# Patient Record
Sex: FEMALE | Race: WHITE | HISPANIC OR LATINO | Employment: FULL TIME | URBAN - METROPOLITAN AREA
[De-identification: names, ages, dates, MRNs, and addresses within clinical notes are randomized per-mention and may not be internally consistent; named-entity substitution may affect disease eponyms.]

---

## 2017-01-02 ENCOUNTER — PRE VISIT (OUTPATIENT)
Dept: NEUROLOGY | Facility: CLINIC | Age: 47
End: 2017-01-02

## 2017-01-02 NOTE — TELEPHONE ENCOUNTER
1.  Date/reason for appt: 1/26/17 MS   2.  Referring provider: ERNESTINE GILMAN   3.  Call to patient (Yes / No - short description): no, referral   4.  Previous care at / records requested from: [facility]   5.  Other: 10/27/16 office note received from [facility], will forward to clinic. Will need to request additional records.

## 2017-01-10 ENCOUNTER — APPOINTMENT (OUTPATIENT)
Dept: OCCUPATIONAL MEDICINE | Facility: OTHER | Age: 47
End: 2017-01-10

## 2017-01-17 ENCOUNTER — APPOINTMENT (OUTPATIENT)
Dept: OCCUPATIONAL MEDICINE | Facility: OTHER | Age: 47
End: 2017-01-17

## 2017-01-19 ENCOUNTER — HISTORY (OUTPATIENT)
Dept: FAMILY MEDICINE | Facility: OTHER | Age: 47
End: 2017-01-19

## 2017-01-19 ENCOUNTER — OFFICE VISIT - GICH (OUTPATIENT)
Dept: FAMILY MEDICINE | Facility: OTHER | Age: 47
End: 2017-01-19

## 2017-01-19 DIAGNOSIS — B00.1 HERPESVIRAL VESICULAR DERMATITIS: ICD-10-CM

## 2017-01-24 ENCOUNTER — HOSPITAL ENCOUNTER (EMERGENCY)
Facility: HOSPITAL | Age: 47
Discharge: HOME | End: 2017-01-24
Attending: PHYSICIAN ASSISTANT | Admitting: PHYSICIAN ASSISTANT
Payer: MEDICAID

## 2017-01-24 VITALS
TEMPERATURE: 97.8 F | SYSTOLIC BLOOD PRESSURE: 136 MMHG | RESPIRATION RATE: 20 BRPM | DIASTOLIC BLOOD PRESSURE: 47 MMHG | OXYGEN SATURATION: 94 %

## 2017-01-24 DIAGNOSIS — L03.115 CELLULITIS OF RIGHT LEG: ICD-10-CM

## 2017-01-24 PROCEDURE — 99212 OFFICE O/P EST SF 10 MIN: CPT

## 2017-01-24 PROCEDURE — 99203 OFFICE O/P NEW LOW 30 MIN: CPT | Performed by: PHYSICIAN ASSISTANT

## 2017-01-24 RX ORDER — MUPIROCIN 20 MG/G
OINTMENT TOPICAL 3 TIMES DAILY
Qty: 15 G | Refills: 1 | Status: SHIPPED | OUTPATIENT
Start: 2017-01-24 | End: 2017-04-07

## 2017-01-24 RX ORDER — CEPHALEXIN 500 MG/1
500 CAPSULE ORAL 3 TIMES DAILY
Qty: 30 CAPSULE | Refills: 0 | Status: SHIPPED | OUTPATIENT
Start: 2017-01-24 | End: 2017-02-03

## 2017-01-24 RX ORDER — GLATIRAMER ACETATE 20 MG/ML
20 INJECTION, SOLUTION SUBCUTANEOUS DAILY
COMMUNITY

## 2017-01-24 ASSESSMENT — ENCOUNTER SYMPTOMS
PSYCHIATRIC NEGATIVE: 1
NEUROLOGICAL NEGATIVE: 1
WOUND: 1
CONSTITUTIONAL NEGATIVE: 1
CARDIOVASCULAR NEGATIVE: 1

## 2017-01-24 NOTE — ED AVS SNAPSHOT
[facility]    [address]    [place] [address]    Phone:  [phone]                                       Carin Shea   MRN: [id number]    Department:  [facility]   Date of Visit:  1/24/2017           Patient Information     Date Of Birth          7/17/1969        Your diagnoses for this visit were:     Cellulitis of right leg Pt states her tetanus is UTD       You were seen by Citlaly Her PA.      Follow-up Information     Follow up with Santana Cota MD.    Specialties:  Internal Medicine, Internal Medicine    Why:  If symptoms worsen    Contact information:    [facility]  [address]  [place] [address]  [phone]        Discharge References/Attachments     CELLULITIS, DISCHARGE INSTRUCTIONS FOR (ENGLISH)      Future Appointments        Provider Department Dept Phone Center    1/25/2017 2:30 PM La Burrell RN  [facility] [phone] [facility]    1/26/2017 1:30 PM Phillip Howell MD [facility] Multiple Sclerosis [phone] [facility]         Review of your medicines      START taking        Dose / Directions Last dose taken    cephALEXin 500 MG capsule   Commonly known as:  KEFLEX   Dose:  500 mg   Quantity:  30 capsule        Take 1 capsule (500 mg) by mouth 3 times daily for 10 days   Refills:  0        mupirocin 2 % ointment   Commonly known as:  BACTROBAN   Quantity:  15 g        Apply topically 3 times daily   Refills:  1          Our records show that you are taking the medicines listed below. If these are incorrect, please call your family doctor or clinic.        Dose / Directions Last dose taken    CITALOPRAM HYDROBROMIDE PO   Dose:  40 mg        Take 40 mg by mouth   Refills:  0        CLONAZEPAM PO   Dose:  1 mg        Take 1 mg by mouth   Refills:  0        COPAXONE 20 MG/ML injection   Dose:  20 mg   Generic drug:  glatiramer        Inject 20 mg Subcutaneous daily   Refills:  0        LISINOPRIL PO  "  Dose:  10 mg        Take 10 mg by mouth   Refills:  0        METFORMIN HCL PO   Dose:  1000 mg        Take 1,000 mg by mouth 2 times daily (with meals)   Refills:  0        Multi-vitamin Tabs tablet   Generic drug:  multivitamin, therapeutic with minerals        1 TABLET DAILY   Refills:  0                Prescriptions were sent or printed at these locations (2 Prescriptions)                   [facility] [address] [place] [address]    Telephone:  [phone]   Fax:  [phone]   Hours:                  E-Prescribed (2 of 2)         cephALEXin (KEFLEX) 500 MG capsule               mupirocin (BACTROBAN) 2 % ointment                Orders Needing Specimen Collection     None      Pending Results     No orders found from 2017 to 2017.            Pending Culture Results     No orders found from 2017 to 2017.            Thank you for choosing [facility]       Thank you for choosing [facility] for your care. Our goal is always to provide you with excellent care. Hearing back from our patients is one way we can continue to improve our services. Please take a few minutes to complete the written survey that you may receive in the mail after you visit with us. Thank you!        [employer] Information     [employer] lets you send messages to your doctor, view your test results, renew your prescriptions, schedule appointments and more. To sign up, go to [email or web address] . Click on \"Log in\" on the left side of the screen, which will take you to the Welcome page. Then click on \"Sign up Now\" on the right side of the page.     You will be asked to enter the access code listed below, as well as some personal information. Please follow the directions to create your username and password.     Your access code is: [email or web address]  Expires: 2017  6:30 AM     Your access code will  in 90 days. If you need help or a new code, please " call your [facility] clinic or [phone].        Care EveryWhere ID     This is your Care EveryWhere ID. This could be used by other organizations to access your [facility] medical records  [id number]        After Visit Summary       This is your record. Keep this with you and show to your community pharmacist(s) and doctor(s) at your next visit.

## 2017-01-24 NOTE — ED AVS SNAPSHOT
[facility]    [address] [place] [address]    Phone:  [phone]                                       Carin Shea   MRN: [id number]    Department:  [facility]   Date of Visit:  1/24/2017           After Visit Summary Signature Page     I have received my discharge instructions, and my questions have been answered. I have discussed any challenges I see with this plan with the nurse or doctor.    ..........................................................................................................................................  Patient/Patient Representative Signature      ..........................................................................................................................................  Patient Representative Print Name and Relationship to Patient    ..................................................               ................................................  Date                                            Time    ..........................................................................................................................................  Reviewed by Signature/Title    ...................................................              ..............................................  Date                                                            Time

## 2017-01-24 NOTE — ED NOTES
Pt presents today with her mother for c/o a laceration to her right lower leg that is red, swollen and warm to the touch.
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
## 2017-01-26 ENCOUNTER — APPOINTMENT (OUTPATIENT)
Dept: OCCUPATIONAL MEDICINE | Facility: OTHER | Age: 47
End: 2017-01-26

## 2017-01-27 ENCOUNTER — DOCUMENTATION ONLY (OUTPATIENT)
Dept: NEUROLOGY | Facility: CLINIC | Age: 47
End: 2017-01-27

## 2017-02-09 ENCOUNTER — HOSPITAL ENCOUNTER (EMERGENCY)
Facility: HOSPITAL | Age: 47
Discharge: HOME | End: 2017-02-09
Attending: INTERNAL MEDICINE | Admitting: INTERNAL MEDICINE
Payer: MEDICAID

## 2017-02-09 VITALS
HEART RATE: 84 BPM | OXYGEN SATURATION: 94 % | RESPIRATION RATE: 20 BRPM | TEMPERATURE: 97.9 F | SYSTOLIC BLOOD PRESSURE: 118 MMHG | DIASTOLIC BLOOD PRESSURE: 67 MMHG

## 2017-02-09 DIAGNOSIS — L08.9 LOCAL INFECTION OF WOUND: ICD-10-CM

## 2017-02-09 DIAGNOSIS — T14.8XXA LOCAL INFECTION OF WOUND: ICD-10-CM

## 2017-02-09 LAB
ALBUMIN SERPL-MCNC: 3.6 G/DL (ref 3.4–5)
ALP SERPL-CCNC: 68 U/L (ref 40–150)
ALT SERPL W P-5'-P-CCNC: 87 U/L (ref 0–50)
ANION GAP SERPL CALCULATED.3IONS-SCNC: 5 MMOL/L (ref 3–14)
AST SERPL W P-5'-P-CCNC: 58 U/L (ref 0–45)
BASOPHILS # BLD AUTO: 0.1 10E9/L (ref 0–0.2)
BASOPHILS NFR BLD AUTO: 0.9 %
BILIRUB SERPL-MCNC: 0.4 MG/DL (ref 0.2–1.3)
BUN SERPL-MCNC: 10 MG/DL (ref 7–30)
CALCIUM SERPL-MCNC: 8.4 MG/DL (ref 8.5–10.1)
CHLORIDE SERPL-SCNC: 106 MMOL/L (ref 94–109)
CK SERPL-CCNC: 122 U/L (ref 30–225)
CO2 SERPL-SCNC: 24 MMOL/L (ref 20–32)
CREAT SERPL-MCNC: 0.65 MG/DL (ref 0.52–1.04)
CRP SERPL-MCNC: 19.7 MG/L (ref 0–8)
DIFFERENTIAL METHOD BLD: NORMAL
EOSINOPHIL # BLD AUTO: 0.3 10E9/L (ref 0–0.7)
EOSINOPHIL NFR BLD AUTO: 3 %
ERYTHROCYTE [DISTWIDTH] IN BLOOD BY AUTOMATED COUNT: 12.9 % (ref 10–15)
ERYTHROCYTE [SEDIMENTATION RATE] IN BLOOD BY WESTERGREN METHOD: 19 MM/H (ref 0–20)
GFR SERPL CREATININE-BSD FRML MDRD: ABNORMAL ML/MIN/1.7M2
GLUCOSE SERPL-MCNC: 173 MG/DL (ref 70–99)
GRAM STN SPEC: NORMAL
HCT VFR BLD AUTO: 36.7 % (ref 35–47)
HGB BLD-MCNC: 12.7 G/DL (ref 11.7–15.7)
IMM GRANULOCYTES # BLD: 0 10E9/L (ref 0–0.4)
IMM GRANULOCYTES NFR BLD: 0.3 %
LYMPHOCYTES # BLD AUTO: 3 10E9/L (ref 0.8–5.3)
LYMPHOCYTES NFR BLD AUTO: 33.5 %
MCH RBC QN AUTO: 30.7 PG (ref 26.5–33)
MCHC RBC AUTO-ENTMCNC: 34.6 G/DL (ref 31.5–36.5)
MCV RBC AUTO: 89 FL (ref 78–100)
MICRO REPORT STATUS: NORMAL
MONOCYTES # BLD AUTO: 0.7 10E9/L (ref 0–1.3)
MONOCYTES NFR BLD AUTO: 7.7 %
NEUTROPHILS # BLD AUTO: 4.9 10E9/L (ref 1.6–8.3)
NEUTROPHILS NFR BLD AUTO: 54.6 %
NRBC # BLD AUTO: 0 10*3/UL
NRBC BLD AUTO-RTO: 0 /100
PLATELET # BLD AUTO: 275 10E9/L (ref 150–450)
POTASSIUM SERPL-SCNC: 3.6 MMOL/L (ref 3.4–5.3)
PROT SERPL-MCNC: 7.5 G/DL (ref 6.8–8.8)
RBC # BLD AUTO: 4.14 10E12/L (ref 3.8–5.2)
SODIUM SERPL-SCNC: 135 MMOL/L (ref 133–144)
SPECIMEN SOURCE: NORMAL
WBC # BLD AUTO: 9 10E9/L (ref 4–11)

## 2017-02-09 PROCEDURE — 85025 COMPLETE CBC W/AUTO DIFF WBC: CPT | Performed by: INTERNAL MEDICINE

## 2017-02-09 PROCEDURE — 87070 CULTURE OTHR SPECIMN AEROBIC: CPT | Performed by: INTERNAL MEDICINE

## 2017-02-09 PROCEDURE — 36415 COLL VENOUS BLD VENIPUNCTURE: CPT | Performed by: INTERNAL MEDICINE

## 2017-02-09 PROCEDURE — 85652 RBC SED RATE AUTOMATED: CPT | Performed by: INTERNAL MEDICINE

## 2017-02-09 PROCEDURE — 86140 C-REACTIVE PROTEIN: CPT | Performed by: INTERNAL MEDICINE

## 2017-02-09 PROCEDURE — 99284 EMERGENCY DEPT VISIT MOD MDM: CPT | Performed by: INTERNAL MEDICINE

## 2017-02-09 PROCEDURE — 25000132 ZZH RX MED GY IP 250 OP 250 PS 637: Performed by: INTERNAL MEDICINE

## 2017-02-09 PROCEDURE — 99284 EMERGENCY DEPT VISIT MOD MDM: CPT | Mod: 25

## 2017-02-09 PROCEDURE — 25000128 H RX IP 250 OP 636: Performed by: INTERNAL MEDICINE

## 2017-02-09 PROCEDURE — 82550 ASSAY OF CK (CPK): CPT | Performed by: INTERNAL MEDICINE

## 2017-02-09 PROCEDURE — 96372 THER/PROPH/DIAG INJ SC/IM: CPT

## 2017-02-09 PROCEDURE — 80053 COMPREHEN METABOLIC PANEL: CPT | Performed by: INTERNAL MEDICINE

## 2017-02-09 PROCEDURE — 87205 SMEAR GRAM STAIN: CPT | Performed by: INTERNAL MEDICINE

## 2017-02-09 RX ORDER — KETOROLAC TROMETHAMINE 30 MG/ML
60 INJECTION, SOLUTION INTRAMUSCULAR; INTRAVENOUS ONCE
Status: COMPLETED | OUTPATIENT
Start: 2017-02-09 | End: 2017-02-09

## 2017-02-09 RX ORDER — SULFAMETHOXAZOLE/TRIMETHOPRIM 800-160 MG
1 TABLET ORAL ONCE
Status: COMPLETED | OUTPATIENT
Start: 2017-02-09 | End: 2017-02-09

## 2017-02-09 RX ORDER — ONDANSETRON 2 MG/ML
4 INJECTION INTRAMUSCULAR; INTRAVENOUS ONCE
Status: COMPLETED | OUTPATIENT
Start: 2017-02-09 | End: 2017-02-09

## 2017-02-09 RX ORDER — SULFAMETHOXAZOLE/TRIMETHOPRIM 800-160 MG
1 TABLET ORAL 2 TIMES DAILY
Qty: 10 TABLET | Refills: 0 | Status: SHIPPED | OUTPATIENT
Start: 2017-02-09 | End: 2017-02-14

## 2017-02-09 RX ADMIN — SULFAMETHOXAZOLE AND TRIMETHOPRIM 1 TABLET: 800; 160 TABLET ORAL at 06:43

## 2017-02-09 RX ADMIN — ONDANSETRON 4 MG: 2 INJECTION INTRAMUSCULAR; INTRAVENOUS at 05:44

## 2017-02-09 RX ADMIN — KETOROLAC TROMETHAMINE 60 MG: 30 INJECTION, SOLUTION INTRAMUSCULAR; INTRAVENOUS at 05:44

## 2017-02-09 ASSESSMENT — ENCOUNTER SYMPTOMS
WHEEZING: 0
NECK STIFFNESS: 0
CONFUSION: 0
VOMITING: 0
SHORTNESS OF BREATH: 0
ANAL BLEEDING: 0
CHEST TIGHTNESS: 0
HEADACHES: 0
BLOOD IN STOOL: 0
VOICE CHANGE: 0
DYSURIA: 0
ABDOMINAL PAIN: 0
LIGHT-HEADEDNESS: 0
BACK PAIN: 0
CHILLS: 0
ABDOMINAL DISTENTION: 0
PALPITATIONS: 0
HEMATURIA: 0
DIZZINESS: 0
NAUSEA: 0
DIAPHORESIS: 0
FLANK PAIN: 0
COUGH: 0
NECK PAIN: 0
WOUND: 0
NUMBNESS: 0
COLOR CHANGE: 0
MYALGIAS: 0
FEVER: 0
ARTHRALGIAS: 0

## 2017-02-09 NOTE — ED NOTES
"Pt to room 1 ambulatory. Pt states that 2 weeks ago she \"fell through\" a heating duct and cut her left lateral calf. Pt was seen in UC and was started on keflex and bactroban, pt has finished the keflex. Pt notes increasing swelling, pain and redness. Laceration is scabbed over with redness around and one are with yellow/white drainage. Pt notes that it is difficult to walk. CMS intact. Call light in reach.  "

## 2017-02-09 NOTE — ED AVS SNAPSHOT
[facility]    [address] [place] [address]    Phone:  [phone]                                       Carin Shea   MRN: [id number]    Department[facility]  [facility]   Date of Visit:  2/9/2017           After Visit Summary Signature Page     I have received my discharge instructions, and my questions have been answered. I have discussed any challenges I see with this plan with the nurse or doctor.    ..........................................................................................................................................  Patient/Patient Representative Signature      ..........................................................................................................................................  Patient Representative Print Name and Relationship to Patient    ..................................................               ................................................  Date                                            Time    ..........................................................................................................................................  Reviewed by Signature/Title    ...................................................              ..............................................  Date                                                            Time

## 2017-02-09 NOTE — ED PROVIDER NOTES
History     Chief Complaint   Patient presents with     Cellulitis     right lateral leg, cut leg 2 weeks ago on heating duct now red, swollen     The history is provided by the patient.     Carin Shea is a 47 year old female who came for R lower leg pain/ redness. She had a laceration 2 wks ago due to fall and cut of lateral part of her R lower leg with metal edge. She was treated with cephalexin for possible celllultis.     I have reviewed the Medications, Allergies, Past Medical and Surgical History, and Social History in the Epic system.    Review of Systems   Constitutional: Negative for fever, chills and diaphoresis.   HENT: Negative for voice change.    Eyes: Negative for visual disturbance.   Respiratory: Negative for cough, chest tightness, shortness of breath and wheezing.    Cardiovascular: Negative for chest pain, palpitations and leg swelling.   Gastrointestinal: Negative for nausea, vomiting, abdominal pain, blood in stool, abdominal distention and anal bleeding.   Genitourinary: Negative for dysuria, hematuria, flank pain and decreased urine volume.   Musculoskeletal: Negative for myalgias, back pain, arthralgias, gait problem, neck pain and neck stiffness.   Skin: Negative for color change, pallor, rash and wound.   Neurological: Negative for dizziness, syncope, light-headedness, numbness and headaches.   Psychiatric/Behavioral: Negative for suicidal ideas and confusion.       Physical Exam   BP: 137/87 mmHg  Pulse: 84  Heart Rate: 95  Temp: 98.2  F (36.8  C)  Resp: 20  SpO2: 98 %  Physical Exam   Constitutional: She is oriented to person, place, and time. She appears well-developed and well-nourished.   HENT:   Head: Normocephalic and atraumatic.   Mouth/Throat: No oropharyngeal exudate.   Eyes: Conjunctivae are normal. Pupils are equal, round, and reactive to light.   Neck: Normal range of motion. Neck supple. No JVD present. No tracheal deviation present. No thyromegaly present.    Cardiovascular: Normal rate, regular rhythm, normal heart sounds and intact distal pulses.  Exam reveals no gallop and no friction rub.    No murmur heard.  Pulmonary/Chest: Effort normal and breath sounds normal. No stridor. No respiratory distress. She has no wheezes. She has no rales. She exhibits no tenderness.   Abdominal: Soft. Bowel sounds are normal. She exhibits no distension and no mass. There is no tenderness. There is no rebound and no guarding.   Musculoskeletal: Normal range of motion. She exhibits no edema or tenderness.   Lymphadenopathy:     She has no cervical adenopathy.   Neurological: She is alert and oriented to person, place, and time.   Skin: Skin is warm and dry. No rash noted. No erythema. No pallor.        12 cm old laceration on lateral R lower leg, slight redness around the edges,   Slight serous discharge with squeezing the skin, no pus     Psychiatric: Her behavior is normal.   Nursing note and vitals reviewed.      ED Course   Procedures               Labs Ordered and Resulted from Time of ED Arrival Up to the Time of Departure from the ED   COMPREHENSIVE METABOLIC PANEL - Abnormal; Notable for the following:     Glucose 173 (*)     Calcium 8.4 (*)     ALT 87 (*)     AST 58 (*)     All other components within normal limits   CRP INFLAMMATION - Abnormal; Notable for the following:     CRP Inflammation 19.7 (*)     All other components within normal limits   CBC WITH PLATELETS DIFFERENTIAL   CK TOTAL   ERYTHROCYTE SEDIMENTATION RATE AUTO   WOUND CULTURE AEROBIC BACTERIAL   GRAM STAIN       Assessments & Plan (with Medical Decision Making)   Labs ordered  Overall clinical and lab finding dose not indicate a deep tissue infection  Wound culture was done  Pt started on bactrim, pt was advised to return to ER if had fever, chill, persistent pain, or if redness around wound is spreading  She voiced understanding and agreeed  I have reviewed the nursing notes.    I have reviewed the  findings, diagnosis, plan and need for follow up with the patient.    New Prescriptions    SULFAMETHOXAZOLE-TRIMETHOPRIM (BACTRIM DS) 800-160 MG PER TABLET    Take 1 tablet by mouth 2 times daily for 5 days       Final diagnoses:   Local infection of wound       2/9/2017   [facility]      Alban Hurt MD  02/09/17 0708

## 2017-02-09 NOTE — DISCHARGE INSTRUCTIONS
Wound Care  Taking proper care of your wound will help it heal. Your healthcare provider may show you how to clean and dress the wound. He or she will also explain how to tell if the wound is healing normally. If you are unsure of how to take care of the wound, be sure to clarify what dressing to use and how often you should change the bandages. Here are the basic steps.     A wound that's not healing normally may be dark in color or have white streaks.   Wash your hands  Tips for washing your hands include:    Use liquid soap and lather for 2 minutes. Scrub between your fingers and under your nails.    Rinse with warm water, keeping your fingers pointing down.    Use a paper towel to dry your hands and to turn off the faucet.  Remove the used dressing  Here are suggestions for removing the dressing:    If dressing changes cause you pain, be sure to take your pain medicine as prescribed by your healthcare provider 30 minutes before dressing changes.    Set up your supplies.    Put on disposable gloves if you re dressing a wound for someone else or your wound is infected.    Loosen the tape by pulling gently toward the wound.    Gently take off the old dressing. If the dressing is stuck to the wound, moisten it with saline (if available) or clean water.    If you have a drain or tube in the wound, be careful not to pull on it.    Remove the dressing 1 layer at a time and put it in a plastic bag.    Remove your gloves.  Inspect and dress the wound  Check the wound carefully:    Each time you change the dressing, inspect the wound carefully to be sure it s healing normally by making sure your wound appears to be pink and moist, and is free of infection.      Wash your hands again. Put on a new pair of gloves.    Clean and dress the wound as directed by your healthcare provider or nurse. Do not put anything in the wound that is not prescribed or directed by your healthcare provider. If you have a drain or tube, be  careful not to pull on it. Make sure to secure the drain or tube as well.    Put all supplies in a plastic bag. Seal the bag and put it in the trash.    Be sure to wash your hands again.  Call your healthcare provider  Call your healthcare provider if you see any of the following signs of a problem:    Bleeding that soaks the dressing    Pink fluid weeping from the wound    Increased drainage or drainage that is yellow, yellow-green, or foul-smelling    Increased swelling or pain, or redness or swelling in the skin around the wound    A change in the color of the wound, or if streaks develop in a direction away from the wound    The area between any stitches opens up    An increase in the size of the wound    A fever over 101 F (38.3 C), chills, increased fatigue, or a loss of appetite.        4490-7808 [employer]. [address] [place] [address]. All rights reserved. This information is not intended as a substitute for professional medical care. Always follow your healthcare professional's instructions.

## 2017-02-09 NOTE — ED NOTES
"Pt states that she is feeling \"better\" and is ready to take antibiotic. Pt given juice with antibiotic.   "

## 2017-02-09 NOTE — ED NOTES
"Pt states that her stomach \"has settled down\". Pt resting comfortably in bed, awaiting her mom to come and pick her up.  "

## 2017-02-09 NOTE — ED AVS SNAPSHOT
[facility]    [address] [place] [address]    [place] [address]    Phone:  [phone]                                       Carin Shea   MRN: [id number]    Department:  [facility]   Date of Visit:  2/9/2017           Patient Information     Date Of Birth          7/17/1969        Your diagnoses for this visit were:     Local infection of wound        You were seen by Alban Hurt MD.      Follow-up Information     Call Santana Cota MD.    Specialties:  Internal Medicine, Internal Medicine    Contact information:    [facility]  [address]  [place] [address]  [phone]          Discharge Instructions         Wound Care  Taking proper care of your wound will help it heal. Your healthcare provider may show you how to clean and dress the wound. He or she will also explain how to tell if the wound is healing normally. If you are unsure of how to take care of the wound, be sure to clarify what dressing to use and how often you should change the bandages. Here are the basic steps.     A wound that's not healing normally may be dark in color or have white streaks.   Wash your hands  Tips for washing your hands include:    Use liquid soap and lather for 2 minutes. Scrub between your fingers and under your nails.    Rinse with warm water, keeping your fingers pointing down.    Use a paper towel to dry your hands and to turn off the faucet.  Remove the used dressing  Here are suggestions for removing the dressing:    If dressing changes cause you pain, be sure to take your pain medicine as prescribed by your healthcare provider 30 minutes before dressing changes.    Set up your supplies.    Put on disposable gloves if you re dressing a wound for someone else or your wound is infected.    Loosen the tape by pulling gently toward the wound.    Gently take off the old dressing. If the dressing is stuck to the wound, moisten it with saline (if available) or  clean water.    If you have a drain or tube in the wound, be careful not to pull on it.    Remove the dressing 1 layer at a time and put it in a plastic bag.    Remove your gloves.  Inspect and dress the wound  Check the wound carefully:    Each time you change the dressing, inspect the wound carefully to be sure it s healing normally by making sure your wound appears to be pink and moist, and is free of infection.      Wash your hands again. Put on a new pair of gloves.    Clean and dress the wound as directed by your healthcare provider or nurse. Do not put anything in the wound that is not prescribed or directed by your healthcare provider. If you have a drain or tube, be careful not to pull on it. Make sure to secure the drain or tube as well.    Put all supplies in a plastic bag. Seal the bag and put it in the trash.    Be sure to wash your hands again.  Call your healthcare provider  Call your healthcare provider if you see any of the following signs of a problem:    Bleeding that soaks the dressing    Pink fluid weeping from the wound    Increased drainage or drainage that is yellow, yellow-green, or foul-smelling    Increased swelling or pain, or redness or swelling in the skin around the wound    A change in the color of the wound, or if streaks develop in a direction away from the wound    The area between any stitches opens up    An increase in the size of the wound    A fever over 101 F (38.3 C), chills, increased fatigue, or a loss of appetite.        8682-2890 [employer]. [address] [place] [address]. All rights reserved. This information is not intended as a substitute for professional medical care. Always follow your healthcare professional's instructions.             Review of your medicines      START taking        Dose / Directions Last dose taken    sulfamethoxazole-trimethoprim 800-160 MG per tablet   Commonly known as:  BACTRIM DS   Dose:  1 tablet   Quantity:  10  tablet        Take 1 tablet by mouth 2 times daily for 5 days   Refills:  0          Our records show that you are taking the medicines listed below. If these are incorrect, please call your family doctor or clinic.        Dose / Directions Last dose taken    CITALOPRAM HYDROBROMIDE PO   Dose:  40 mg        Take 40 mg by mouth   Refills:  0        CLONAZEPAM PO   Dose:  1 mg        Take 1 mg by mouth At Bedtime   Refills:  0        COPAXONE 20 MG/ML injection   Dose:  20 mg   Generic drug:  glatiramer        Inject 20 mg Subcutaneous daily   Refills:  0        LISINOPRIL PO   Dose:  10 mg        Take 10 mg by mouth   Refills:  0        METFORMIN HCL PO   Dose:  1000 mg        Take 1,000 mg by mouth 2 times daily (with meals)   Refills:  0        Multi-vitamin Tabs tablet   Generic drug:  multivitamin, therapeutic with minerals        1 TABLET DAILY   Refills:  0        mupirocin 2 % ointment   Commonly known as:  BACTROBAN   Quantity:  15 g        Apply topically 3 times daily   Refills:  1                Prescriptions were sent or printed at these locations (1 Prescription)                   [facility] [address] [place] [address]    Telephone:  [phone]   Fax:  [phone]   Hours:                  Printed at Department/Unit printer (1 of 1)         sulfamethoxazole-trimethoprim (BACTRIM DS) 800-160 MG per tablet                Procedures and tests performed during your visit     CBC with platelets differential    CK total    CRP inflammation    Comprehensive metabolic panel    Erythrocyte sedimentation rate auto    Gram stain    Wound Culture Aerobic Bacterial      Orders Needing Specimen Collection     None      Pending Results     Date and Time Order Name Status Description    2/9/2017 0446 Gram stain In process     2/9/2017 0446 Wound Culture Aerobic Bacterial In process             Pending Culture Results     Date and Time Order  "Name Status Description    2017 0446 Gram stain In process     2017 0446 Wound Culture Aerobic Bacterial In process             Thank you for choosing [facility]       Thank you for choosing [facility] for your care. Our goal is always to provide you with excellent care. Hearing back from our patients is one way we can continue to improve our services. Please take a few minutes to complete the written survey that you may receive in the mail after you visit with us. Thank you!        [employer] Information     [employer] lets you send messages to your doctor, view your test results, renew your prescriptions, schedule appointments and more. To sign up, go to [email or web address] . Click on \"Log in\" on the left side of the screen, which will take you to the Welcome page. Then click on \"Sign up Now\" on the right side of the page.     You will be asked to enter the access code listed below, as well as some personal information. Please follow the directions to create your username and password.     Your access code is: [email or web address]  Expires: 2017  6:30 AM     Your access code will  in 90 days. If you need help or a new code, please call your [facility] clinic or [phone].        Care EveryWhere ID     This is your Care EveryWhere ID. This could be used by other organizations to access your [facility] medical records  [id number]        After Visit Summary       This is your record. Keep this with you and show to your community pharmacist(s) and doctor(s) at your next visit.                  "

## 2017-02-09 NOTE — ED NOTES
Pt given verbal and written d/c instructions. Pt given prescription for bactrim. Pt waiting in room for mom who is pt's ride home.

## 2017-02-11 LAB
BACTERIA SPEC CULT: NORMAL
MICRO REPORT STATUS: NORMAL
SPECIMEN SOURCE: NORMAL

## 2017-02-14 ENCOUNTER — TELEPHONE (OUTPATIENT)
Dept: CASE MANAGEMENT | Facility: HOSPITAL | Age: 47
End: 2017-02-14

## 2017-02-15 ENCOUNTER — TELEPHONE (OUTPATIENT)
Dept: CASE MANAGEMENT | Facility: HOSPITAL | Age: 47
End: 2017-02-15

## 2017-04-07 ENCOUNTER — HOSPITAL ENCOUNTER (EMERGENCY)
Facility: HOSPITAL | Age: 47
Discharge: HOME | End: 2017-04-07
Attending: INTERNAL MEDICINE | Admitting: INTERNAL MEDICINE

## 2017-04-07 VITALS
OXYGEN SATURATION: 98 % | HEART RATE: 102 BPM | RESPIRATION RATE: 16 BRPM | DIASTOLIC BLOOD PRESSURE: 87 MMHG | TEMPERATURE: 98.6 F | SYSTOLIC BLOOD PRESSURE: 148 MMHG

## 2017-04-07 DIAGNOSIS — F41.9 ANXIETY: ICD-10-CM

## 2017-04-07 PROCEDURE — 25000132 ZZH RX MED GY IP 250 OP 250 PS 637: Performed by: INTERNAL MEDICINE

## 2017-04-07 PROCEDURE — 99283 EMERGENCY DEPT VISIT LOW MDM: CPT

## 2017-04-07 PROCEDURE — 99283 EMERGENCY DEPT VISIT LOW MDM: CPT | Performed by: INTERNAL MEDICINE

## 2017-04-07 RX ORDER — LORAZEPAM 1 MG/1
1 TABLET ORAL EVERY 8 HOURS PRN
Qty: 4 TABLET | Refills: 0 | Status: SHIPPED | OUTPATIENT
Start: 2017-04-07 | End: 2017-05-25

## 2017-04-07 RX ORDER — LORAZEPAM 1 MG/1
2 TABLET ORAL ONCE
Status: COMPLETED | OUTPATIENT
Start: 2017-04-07 | End: 2017-04-07

## 2017-04-07 RX ADMIN — LORAZEPAM 2 MG: 1 TABLET ORAL at 22:02

## 2017-04-07 NOTE — ED AVS SNAPSHOT
[facility]    [address] [address]    Phone:  [phone]                                       Carin Shea   MRN: [id number]    Department:  [facility]   Date of Visit:  4/7/2017           After Visit Summary Signature Page     I have received my discharge instructions, and my questions have been answered. I have discussed any challenges I see with this plan with the nurse or doctor.    ..........................................................................................................................................  Patient/Patient Representative Signature      ..........................................................................................................................................  Patient Representative Print Name and Relationship to Patient    ..................................................               ................................................  Date                                            Time    ..........................................................................................................................................  Reviewed by Signature/Title    ...................................................              ..............................................  Date                                                            Time

## 2017-04-07 NOTE — ED AVS SNAPSHOT
[facility]    [address]    [place] [address]    Phone:  [phone]                                       Carin Shea   MRN: [id number]    Department[facility]   Date of Visit:  4/7/2017           Patient Information     Date Of Birth          7/17/1969        Your diagnoses for this visit were:     Anxiety        You were seen by Alban Hurt MD.      Follow-up Information     Follow up with Santana Cota MD.    Specialties:  Internal Medicine, Internal Medicine    Contact information:    [facility]  [address]  [place] [address]  [phone]          Discharge Instructions         Anxiety Reaction  Anxiety is the feeling we all get when we think something bad might happen. It is a normal response to stress and usually causes only a mild reaction. When anxiety becomes more severe, it can interfere with daily life. In some cases, you may not even be aware of what it is you re anxious about. There may also be a genetic link or it may be a learned behavior in the home.  Both psychological and physical triggers cause stress reaction. It's often a response to fear or emotional stress, real or imagined. This stress may come from home, family, work, or social relationships.  During an anxiety reaction, you may feel:    Helpless    Nervous    Depressed    Irritable  Your body may show signs of anxiety in many ways. You may experience:    Dry mouth    Shakiness    Dizziness    Weakness    Trouble breathing    Breathing fast (hyperventilating)    Chest pressure    Sweating    Headache    Nausea    Diarrhea    Tiredness    Inability to sleep    Sexual problems  Home care    Try to locate the sources of stress in your life. They may not be obvious. These may include:    Daily hassles of life (traffic jams, missed appointments, car troubles, etc.)    Major life changes, both good (new baby, job promotion) and bad (loss of job, loss of loved  one)    Overload: feeling that you have too many responsibilities and can't take care of all of them at once    Feeling helpless, feeling that your problems are beyond what you re able to solve    Notice how your body reacts to stress. Learn to listen to your body signals. This will help you take action before the stress becomes severe.    When you can, do something about the source of your stress. (Avoid hassles, limit the amount of change that happens in your life at one time and take a break when you feel overloaded).    Unfortunately, many stressful situations can't be avoided. It is necessary to learn how to better manage stress. There are many proven methods that will reduce your anxiety. These include simple things like exercise, good nutrition and adequate rest. Also, there are certain techniques that are helpful:    Relaxation    Breathing exercises    Visualization    Biofeedback    Meditation  For more information about this, consult your doctor or go to a local bookstore and review the many books and tapes available on this subject.  Follow-up care  If you feel that your anxiety is not responding to self-help measures, contact your doctor or make an appointment with a counselor. You may need short-term psychological counseling and temporary medicine to help you manage stress.  Call 911  Call your healthcare provider right away if any of these occur:    Trouble breathing    Confusion    Drowsiness or trouble wakening    Fainting or loss of consciousness    Rapid heart rate    Seizure    New chest pain that becomes more severe, lasts longer, or spreads into your shoulder, arm, neck, jaw, or back  When to seek medical advice  Call your healthcare provider right away if any of these occur:    Your symptoms get worse    Severe headache not relieved by rest and mild pain reliever    5634-3233 [employer]. [address] [place] [address], [place] [address]. All rights reserved. This information is not  intended as a substitute for professional medical care. Always follow your healthcare professional's instructions.             Review of your medicines      START taking        Dose / Directions Last dose taken    LORazepam 1 MG tablet   Commonly known as:  ATIVAN   Dose:  1 mg   Quantity:  4 tablet        Take 1 tablet (1 mg) by mouth every 8 hours as needed for anxiety Take 30 minutes prior to departure.  Do not operate a vehicle after taking this medication   Refills:  0          Our records show that you are taking the medicines listed below. If these are incorrect, please call your family doctor or clinic.        Dose / Directions Last dose taken    COPAXONE 20 MG/ML injection   Dose:  20 mg   Generic drug:  glatiramer        Inject 20 mg Subcutaneous daily   Refills:  0                Prescriptions were sent or printed at these locations (1 Prescription)                   [facility] [address] [place] [address]    Telephone:  [phone]   Fax:  [phone]   Hours:                  Printed at Department/Unit printer (1 of 1)         LORazepam (ATIVAN) 1 MG tablet                Orders Needing Specimen Collection     None      Pending Results     No orders found from 4/5/2017 to 4/8/2017.            Pending Culture Results     No orders found from 4/5/2017 to 4/8/2017.            Thank you for choosing [facility]       Thank you for choosing [facility] for your care. Our goal is always to provide you with excellent care. Hearing back from our patients is one way we can continue to improve our services. Please take a few minutes to complete the written survey that you may receive in the mail after you visit with us. Thank you!        [employer] Information     [employer] lets you send messages to your doctor, view your test results, renew your prescriptions, schedule appointments and more. To sign up, go to [email or web address] . Click on  "\"Log in\" on the left side of the screen, which will take you to the Welcome page. Then click on \"Sign up Now\" on the right side of the page.     You will be asked to enter the access code listed below, as well as some personal information. Please follow the directions to create your username and password.     Your access code is: [email or web address]  Expires: 2017  7:30 AM     Your access code will  in 90 days. If you need help or a new code, please call your [facility] clinic or [phone].        Care EveryWhere ID     This is your Care EveryWhere ID. This could be used by other organizations to access your [facility] medical records  [id number]        After Visit Summary       This is your record. Keep this with you and show to your community pharmacist(s) and doctor(s) at your next visit.                  "

## 2017-04-08 NOTE — ED NOTES
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

## 2017-04-08 NOTE — ED NOTES
Pt comes in with increased feelings of anxiety related to patient combative behavior towards her. Pt reported increased symptoms of shaking, vomiting, fearful.  Pt tearful with conversation, recalling events.

## 2017-04-08 NOTE — ED NOTES
Patient presents to the emergency room with complaints of an anxiety attack.  Patient is a worker on the behavioral health unit.  Patient states she has been working upstairs with a very sick, psychotic, violent patient and today she became very upset while working with this client.  Patient states she is having a hard time breathing, feels like her heart is going to pound out of his chest and feeling numb.  Patient is very upset and tearful, reassurance is given.  Call light within reach.

## 2017-04-08 NOTE — DISCHARGE INSTRUCTIONS

## 2017-04-08 NOTE — ED NOTES
Pt given all discharge instructions, no questions or concerns.  Paper script given for ativan.  Pt to have friend transport home.

## 2017-04-09 ASSESSMENT — ENCOUNTER SYMPTOMS
CHEST TIGHTNESS: 0
DYSURIA: 0
NUMBNESS: 0
SLEEP DISTURBANCE: 1
CONFUSION: 0
WHEEZING: 0
FLANK PAIN: 0
WOUND: 0
BLOOD IN STOOL: 0
NECK PAIN: 0
LIGHT-HEADEDNESS: 0
VOICE CHANGE: 0
COLOR CHANGE: 0
ABDOMINAL PAIN: 0
DIZZINESS: 0
BACK PAIN: 0
NECK STIFFNESS: 0
ANAL BLEEDING: 0
HEMATURIA: 0
VOMITING: 0
NAUSEA: 0
ABDOMINAL DISTENTION: 0
MYALGIAS: 0
ARTHRALGIAS: 0
COUGH: 0
SHORTNESS OF BREATH: 0
HEADACHES: 0
PALPITATIONS: 0
NERVOUS/ANXIOUS: 1
FEVER: 0
CHILLS: 0
DIAPHORESIS: 0

## 2017-04-09 NOTE — ED PROVIDER NOTES
History     Chief Complaint   Patient presents with     Anxiety     c/o anxiety attack     The history is provided by the patient.     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     I have reviewed the Medications, Allergies, Past Medical and Surgical History, and Social History in the Epic system.    Review of Systems   Constitutional: Negative for chills, diaphoresis and fever.   HENT: Negative for voice change.    Eyes: Negative for visual disturbance.   Respiratory: Negative for cough, chest tightness, shortness of breath and wheezing.    Cardiovascular: Negative for chest pain, palpitations and leg swelling.   Gastrointestinal: Negative for abdominal distention, abdominal pain, anal bleeding, blood in stool, nausea and vomiting.   Genitourinary: Negative for decreased urine volume, dysuria, flank pain and hematuria.   Musculoskeletal: Negative for arthralgias, back pain, gait problem, myalgias, neck pain and neck stiffness.   Skin: Negative for color change, pallor, rash and wound.   Neurological: Negative for dizziness, syncope, light-headedness, numbness and headaches.   Psychiatric/Behavioral: Positive for sleep disturbance. Negative for behavioral problems, confusion and suicidal ideas. The patient is nervous/anxious.        Physical Exam   BP: (!) 211/118  Pulse: 102  Heart Rate: 106  Temp: 98.6  F (37  C)  Resp: 24  SpO2: 99 %  Physical Exam   Constitutional: She is oriented to person, place, and time. She appears well-developed and well-nourished.   HENT:   Head: Normocephalic and atraumatic.   Mouth/Throat: No oropharyngeal exudate.   Eyes: Conjunctivae are normal. Pupils are equal, round, and reactive to light.   Neck: Normal range of motion. Neck supple. No JVD present. No tracheal deviation present. No thyromegaly present.   Cardiovascular: Normal rate, regular rhythm, normal  heart sounds and intact distal pulses.  Exam reveals no gallop and no friction rub.    No murmur heard.  Pulmonary/Chest: Effort normal and breath sounds normal. No stridor. No respiratory distress. She has no wheezes. She has no rales. She exhibits no tenderness.   Abdominal: Soft. Bowel sounds are normal. She exhibits no distension and no mass. There is no tenderness. There is no rebound and no guarding.   Musculoskeletal: Normal range of motion. She exhibits no edema or tenderness.   Lymphadenopathy:     She has no cervical adenopathy.   Neurological: She is alert and oriented to person, place, and time.   Skin: Skin is warm and dry. No rash noted. No erythema. No pallor.   Psychiatric: Her behavior is normal. Judgment normal. Her mood appears anxious. Her affect is not angry, not blunt, not labile and not inappropriate. Thought content is not paranoid and not delusional. Cognition and memory are normal. She does not exhibit a depressed mood. She expresses no suicidal ideation. She expresses no suicidal plans.   Nursing note and vitals reviewed.      ED Course     ED Course     Procedures                 Labs Ordered and Resulted from Time of ED Arrival Up to the Time of Departure from the ED - No data to display    Assessments & Plan (with Medical Decision Making)   Anxiety due to work related stressful situation  Symptoms resolved after receiving ativan  Dc home, fu with PCP  I have reviewed the nursing notes.    I have reviewed the findings, diagnosis, plan and need for follow up with the patient.    Discharge Medication List as of 4/7/2017 11:06 PM      START taking these medications    Details   LORazepam (ATIVAN) 1 MG tablet Take 1 tablet (1 mg) by mouth every 8 hours as needed for anxiety Take 30 minutes prior to departure.  Do not operate a vehicle after taking this medication, Disp-4 tablet, R-0, Local Print             Final diagnoses:   Anxiety       4/7/2017   [facility]     Alban Hurt,  MD  04/09/17 [id number]

## 2017-05-07 ENCOUNTER — HISTORY (OUTPATIENT)
Dept: EMERGENCY MEDICINE | Facility: OTHER | Age: 47
End: 2017-05-07

## 2017-05-09 ENCOUNTER — HISTORY (OUTPATIENT)
Dept: ORTHOPEDICS | Facility: OTHER | Age: 47
End: 2017-05-09

## 2017-05-09 ENCOUNTER — AMBULATORY - GICH (OUTPATIENT)
Dept: ORTHOPEDICS | Facility: OTHER | Age: 47
End: 2017-05-09

## 2017-05-09 ENCOUNTER — OFFICE VISIT - GICH (OUTPATIENT)
Dept: ORTHOPEDICS | Facility: OTHER | Age: 47
End: 2017-05-09

## 2017-05-09 DIAGNOSIS — S92.254D: ICD-10-CM

## 2017-05-24 ENCOUNTER — TELEPHONE (OUTPATIENT)
Dept: FAMILY MEDICINE | Facility: OTHER | Age: 47
End: 2017-05-24

## 2017-05-24 NOTE — TELEPHONE ENCOUNTER
3:45 PM    Reason for Call: Phone Call    Description: Pt had an EST CARE appt for 05/25/17 at 2:30 arrival time (2:45 doctor time). She had called to see if there was a different time she could change it to. Her phone is very hard to hear her on and  must have thought she said to cancel. Pt called back to see if we could hear her better but was informed appt was canceled. Tried to put appt back in but there are now holds in that spot. Pt wondering if she can get this appt back. Please call her back at [phone]    Was an appointment offered for this call? No    Preferred method for responding to this message: Telephone Call    If we cannot reach you directly, may we leave a detailed response at the number you provided? Yes    Can this message wait until your PCP/provider returns, if available today? Not applicable    Jemima Roberson

## 2017-05-25 ENCOUNTER — OFFICE VISIT (OUTPATIENT)
Dept: FAMILY MEDICINE | Facility: OTHER | Age: 47
End: 2017-05-25
Attending: PHYSICIAN ASSISTANT
Payer: COMMERCIAL

## 2017-05-25 VITALS
WEIGHT: 268 LBS | HEIGHT: 62 IN | OXYGEN SATURATION: 98 % | SYSTOLIC BLOOD PRESSURE: 112 MMHG | DIASTOLIC BLOOD PRESSURE: 62 MMHG | TEMPERATURE: 97.5 F | HEART RATE: 94 BPM | BODY MASS INDEX: 49.32 KG/M2

## 2017-05-25 DIAGNOSIS — G89.4 CHRONIC PAIN SYNDROME: ICD-10-CM

## 2017-05-25 DIAGNOSIS — R30.0 DYSURIA: ICD-10-CM

## 2017-05-25 DIAGNOSIS — Z76.89 ESTABLISHING CARE WITH NEW DOCTOR, ENCOUNTER FOR: Primary | ICD-10-CM

## 2017-05-25 DIAGNOSIS — B00.9 HERPES SIMPLEX VIRUS INFECTION: ICD-10-CM

## 2017-05-25 DIAGNOSIS — Z71.89 ACP (ADVANCE CARE PLANNING): Chronic | ICD-10-CM

## 2017-05-25 DIAGNOSIS — F41.1 ANXIETY STATE: ICD-10-CM

## 2017-05-25 DIAGNOSIS — I10 BENIGN ESSENTIAL HYPERTENSION: ICD-10-CM

## 2017-05-25 DIAGNOSIS — E11.65 TYPE 2 DIABETES MELLITUS WITH HYPERGLYCEMIA, WITHOUT LONG-TERM CURRENT USE OF INSULIN (H): ICD-10-CM

## 2017-05-25 DIAGNOSIS — E78.2 MIXED HYPERLIPIDEMIA: ICD-10-CM

## 2017-05-25 DIAGNOSIS — G35 MULTIPLE SCLEROSIS (H): ICD-10-CM

## 2017-05-25 LAB
ALBUMIN UR-MCNC: 30 MG/DL
APPEARANCE UR: CLEAR
BACTERIA #/AREA URNS HPF: ABNORMAL /HPF
BILIRUB UR QL STRIP: NEGATIVE
COLOR UR AUTO: YELLOW
GLUCOSE UR STRIP-MCNC: NEGATIVE MG/DL
HGB UR QL STRIP: NEGATIVE
HYALINE CASTS #/AREA URNS LPF: 3 /LPF (ref 0–2)
KETONES UR STRIP-MCNC: 5 MG/DL
LEUKOCYTE ESTERASE UR QL STRIP: NEGATIVE
MUCOUS THREADS #/AREA URNS LPF: PRESENT /LPF
NITRATE UR QL: NEGATIVE
PH UR STRIP: 5.5 PH (ref 4.7–8)
RBC #/AREA URNS AUTO: 0 /HPF (ref 0–2)
SP GR UR STRIP: 1.03 (ref 1–1.03)
SQUAMOUS #/AREA URNS AUTO: 3 /HPF (ref 0–1)
URN SPEC COLLECT METH UR: ABNORMAL
UROBILINOGEN UR STRIP-MCNC: 2 MG/DL (ref 0–2)
WBC #/AREA URNS AUTO: 1 /HPF (ref 0–2)

## 2017-05-25 PROCEDURE — 81001 URINALYSIS AUTO W/SCOPE: CPT | Mod: 59 | Performed by: PHYSICIAN ASSISTANT

## 2017-05-25 PROCEDURE — 99000 SPECIMEN HANDLING OFFICE-LAB: CPT | Performed by: PHYSICIAN ASSISTANT

## 2017-05-25 PROCEDURE — 99204 OFFICE O/P NEW MOD 45 MIN: CPT | Performed by: PHYSICIAN ASSISTANT

## 2017-05-25 PROCEDURE — 80307 DRUG TEST PRSMV CHEM ANLYZR: CPT | Mod: 90 | Performed by: PHYSICIAN ASSISTANT

## 2017-05-25 RX ORDER — CITALOPRAM HYDROBROMIDE 40 MG/1
40 TABLET ORAL DAILY
Qty: 30 TABLET | Refills: 3 | Status: SHIPPED | OUTPATIENT
Start: 2017-05-25 | End: 2017-08-11

## 2017-05-25 RX ORDER — CYCLOBENZAPRINE HCL 10 MG
10 TABLET ORAL PRN
COMMUNITY
Start: 2016-12-10 | End: 2017-05-25

## 2017-05-25 RX ORDER — CITALOPRAM HYDROBROMIDE 40 MG/1
40 TABLET ORAL DAILY
COMMUNITY
End: 2017-05-25

## 2017-05-25 RX ORDER — ZALEPLON 10 MG/1
10 CAPSULE ORAL DAILY
COMMUNITY
Start: 2016-12-10 | End: 2017-05-25

## 2017-05-25 RX ORDER — AMLODIPINE BESYLATE 5 MG/1
5 TABLET ORAL DAILY
Qty: 30 TABLET | Refills: 3 | Status: SHIPPED | OUTPATIENT
Start: 2017-05-25 | End: 2017-09-18

## 2017-05-25 RX ORDER — LISINOPRIL 10 MG/1
10 TABLET ORAL DAILY
COMMUNITY
Start: 2017-04-01 | End: 2017-05-25

## 2017-05-25 RX ORDER — ATORVASTATIN CALCIUM 10 MG/1
10 TABLET, FILM COATED ORAL DAILY
Qty: 30 TABLET | Refills: 1 | Status: SHIPPED | OUTPATIENT
Start: 2017-05-25 | End: 2018-02-12

## 2017-05-25 RX ORDER — LISINOPRIL 10 MG/1
10 TABLET ORAL DAILY
Qty: 30 TABLET | Refills: 3 | Status: SHIPPED | OUTPATIENT
Start: 2017-05-25 | End: 2017-09-18

## 2017-05-25 RX ORDER — CYCLOBENZAPRINE HCL 10 MG
10 TABLET ORAL PRN
Qty: 90 TABLET | Refills: 3 | Status: SHIPPED | OUTPATIENT
Start: 2017-05-25 | End: 2017-08-11

## 2017-05-25 RX ORDER — LANCING DEVICE/LANCETS
KIT MISCELLANEOUS
COMMUNITY
Start: 2017-04-05 | End: 2017-05-25

## 2017-05-25 RX ORDER — POTASSIUM CHLORIDE 1500 MG/1
20 TABLET, EXTENDED RELEASE ORAL DAILY
COMMUNITY
Start: 2017-04-01 | End: 2017-05-25

## 2017-05-25 RX ORDER — GLIPIZIDE 5 MG/1
5 TABLET ORAL DAILY
Qty: 30 TABLET | Refills: 3 | Status: SHIPPED | OUTPATIENT
Start: 2017-05-25 | End: 2017-08-11

## 2017-05-25 RX ORDER — CLONAZEPAM 1 MG/1
1 TABLET ORAL 3 TIMES DAILY PRN
Qty: 90 TABLET | Refills: 0 | Status: SHIPPED | OUTPATIENT
Start: 2017-05-25 | End: 2017-06-14

## 2017-05-25 RX ORDER — AMLODIPINE BESYLATE 5 MG/1
5 TABLET ORAL DAILY
COMMUNITY
Start: 2016-12-10 | End: 2017-05-25

## 2017-05-25 RX ORDER — CLONAZEPAM 1 MG/1
1 TABLET ORAL 3 TIMES DAILY PRN
COMMUNITY
Start: 2016-12-10 | End: 2017-05-25

## 2017-05-25 RX ORDER — METFORMIN HCL 500 MG
1000 TABLET, EXTENDED RELEASE 24 HR ORAL DAILY
Qty: 60 TABLET | Refills: 3 | Status: SHIPPED | OUTPATIENT
Start: 2017-05-25 | End: 2017-08-11

## 2017-05-25 RX ORDER — HYDROCHLOROTHIAZIDE 12.5 MG/1
12.5 TABLET ORAL DAILY
Qty: 60 TABLET | Refills: 3 | Status: SHIPPED | OUTPATIENT
Start: 2017-05-25 | End: 2017-09-18

## 2017-05-25 RX ORDER — HYDROCHLOROTHIAZIDE 12.5 MG/1
12.5 TABLET ORAL DAILY
COMMUNITY
Start: 2016-12-10 | End: 2017-05-25

## 2017-05-25 RX ORDER — LORAZEPAM 1 MG/1
1 TABLET ORAL EVERY 8 HOURS PRN
Qty: 4 TABLET | Refills: 0 | Status: SHIPPED | OUTPATIENT
Start: 2017-05-25 | End: 2017-06-02

## 2017-05-25 RX ORDER — VALACYCLOVIR HYDROCHLORIDE 1 G/1
2000 TABLET, FILM COATED ORAL 2 TIMES DAILY PRN
COMMUNITY
End: 2017-05-25

## 2017-05-25 RX ORDER — ZALEPLON 10 MG/1
10 CAPSULE ORAL DAILY
Qty: 30 CAPSULE | Refills: 0 | Status: SHIPPED | OUTPATIENT
Start: 2017-05-25 | End: 2017-08-11

## 2017-05-25 RX ORDER — POTASSIUM CHLORIDE 1500 MG/1
20 TABLET, EXTENDED RELEASE ORAL DAILY
Qty: 30 TABLET | Refills: 0 | Status: SHIPPED | OUTPATIENT
Start: 2017-05-25

## 2017-05-25 RX ORDER — METFORMIN HCL 500 MG
1000 TABLET, EXTENDED RELEASE 24 HR ORAL DAILY
COMMUNITY
Start: 2017-04-12 | End: 2017-05-25

## 2017-05-25 RX ORDER — GLATIRAMER ACETATE 20 MG/ML
20 INJECTION, SOLUTION SUBCUTANEOUS DAILY
Status: CANCELLED | OUTPATIENT
Start: 2017-05-25

## 2017-05-25 RX ORDER — ERGOCALCIFEROL 1.25 MG/1
50000 CAPSULE, LIQUID FILLED ORAL DAILY
COMMUNITY
Start: 2016-12-12 | End: 2017-05-25

## 2017-05-25 RX ORDER — LANCING DEVICE/LANCETS
KIT MISCELLANEOUS
Qty: 1 EACH | Refills: 0 | Status: SHIPPED | OUTPATIENT
Start: 2017-05-25

## 2017-05-25 RX ORDER — HYDROCODONE BITARTRATE AND ACETAMINOPHEN 10; 325 MG/1; MG/1
1 TABLET ORAL EVERY 8 HOURS PRN
Qty: 60 TABLET | Refills: 0 | Status: SHIPPED | OUTPATIENT
Start: 2017-05-25 | End: 2017-09-18

## 2017-05-25 RX ORDER — ATORVASTATIN CALCIUM 10 MG/1
10 TABLET, FILM COATED ORAL DAILY
Qty: 30 TABLET | Refills: 1 | Status: SHIPPED | OUTPATIENT
Start: 2017-05-25 | End: 2017-05-25

## 2017-05-25 RX ORDER — HYDROCODONE BITARTRATE AND ACETAMINOPHEN 10; 325 MG/1; MG/1
1-2 TABLET ORAL EVERY 6 HOURS PRN
COMMUNITY
Start: 2017-04-25 | End: 2017-05-25

## 2017-05-25 RX ORDER — GLIPIZIDE 5 MG/1
5 TABLET ORAL DAILY
COMMUNITY
Start: 2017-04-20 | End: 2017-05-25

## 2017-05-25 RX ORDER — VALACYCLOVIR HYDROCHLORIDE 1 G/1
2000 TABLET, FILM COATED ORAL 2 TIMES DAILY PRN
Qty: 21 TABLET | Refills: 3 | Status: SHIPPED | OUTPATIENT
Start: 2017-05-25 | End: 2017-08-11

## 2017-05-25 RX ORDER — ERGOCALCIFEROL 1.25 MG/1
50000 CAPSULE, LIQUID FILLED ORAL DAILY
Qty: 30 CAPSULE | Refills: 1 | Status: SHIPPED | OUTPATIENT
Start: 2017-05-25 | End: 2017-05-26

## 2017-05-25 RX ORDER — HYDROCODONE BITARTRATE AND ACETAMINOPHEN 10; 325 MG/1; MG/1
1 TABLET ORAL EVERY 8 HOURS PRN
Qty: 60 TABLET | Refills: 0 | Status: SHIPPED | OUTPATIENT
Start: 2017-05-25 | End: 2017-05-25

## 2017-05-25 ASSESSMENT — ANXIETY QUESTIONNAIRES
7. FEELING AFRAID AS IF SOMETHING AWFUL MIGHT HAPPEN: NOT AT ALL
GAD7 TOTAL SCORE: 1
1. FEELING NERVOUS, ANXIOUS, OR ON EDGE: NOT AT ALL
IF YOU CHECKED OFF ANY PROBLEMS ON THIS QUESTIONNAIRE, HOW DIFFICULT HAVE THESE PROBLEMS MADE IT FOR YOU TO DO YOUR WORK, TAKE CARE OF THINGS AT HOME, OR GET ALONG WITH OTHER PEOPLE: NOT DIFFICULT AT ALL
6. BECOMING EASILY ANNOYED OR IRRITABLE: NOT AT ALL
2. NOT BEING ABLE TO STOP OR CONTROL WORRYING: NOT AT ALL
5. BEING SO RESTLESS THAT IT IS HARD TO SIT STILL: NOT AT ALL
3. WORRYING TOO MUCH ABOUT DIFFERENT THINGS: NOT AT ALL

## 2017-05-25 ASSESSMENT — PAIN SCALES - GENERAL: PAINLEVEL: MODERATE PAIN (5)

## 2017-05-25 ASSESSMENT — PATIENT HEALTH QUESTIONNAIRE - PHQ9: 5. POOR APPETITE OR OVEREATING: SEVERAL DAYS

## 2017-05-25 NOTE — MR AVS SNAPSHOT
After Visit Summary   5/25/2017    Carin Shea    MRN: [id number]           Patient Information     Date Of Birth          7/17/1969        Visit Information        Provider Department      5/25/2017 2:45 PM Mary Rizo, PA [facility] [place]        Today's Diagnoses     Type 2 diabetes mellitus with hyperglycemia, without long-term current use of insulin (H)    -  1    ACP (advance care planning)        Mixed hyperlipidemia        Anxiety state        Chronic pain syndrome        Dysuria          Care Instructions    Psychologists/ counselors  [place]  [phone]  Dr. Rhett Bernardo [phone]  [facility]  [phone]  [facility] [phone]  Blane Whitlock  [phone]   [employer]  [phone]  (kids)  [employer] [phone]  (teens)  [facility]      [phone]  [facility] [phone]    [facility]     [phone]   [facility] counseling [phone]  Den Clemons [phone]  Penny Rodriguez [phone]  [facility]     [phone]  [facility]/ adult family     [phone]  [facility]     [phone]  [place]  Adi Fraga  [phone]  [facility]     [phone]  [facility] Dr. PASTOR Tomlinson     [phone]                        Follow-ups after your visit        Future tests that were ordered for you today     Open Future Orders        Priority Expected Expires Ordered    TSH with free T4 reflex Routine  5/26/2018 5/25/2017    Lipid Profile Routine  5/25/2018 5/25/2017    Albumin Random Urine Quantitative Routine  5/25/2018 5/25/2017    Hemoglobin A1c Routine  5/25/2018 5/25/2017            Who to contact     If you have questions or need follow up information about today's clinic visit or your schedule please contact [facility] directly at  "[phone].  Normal or non-critical lab and imaging results will be communicated to you by MyChart, letter or phone within 4 business days after the clinic has received the results. If you do not hear from us within 7 days, please contact the clinic through MyChart or phone. If you have a critical or abnormal lab result, we will notify you by phone as soon as possible.  Submit refill requests through [place] or call your pharmacy and they will forward the refill request to us. Please allow 3 business days for your refill to be completed.          Additional Information About Your Visit        Care EveryWhere ID     This is your Care EveryWhere ID. This could be used by other organizations to access your [facility] medical records  [id number]        Your Vitals Were     Pulse Temperature Height Last Period Pulse Oximetry Breastfeeding?    94 97.5  F (36.4  C) (Tympanic) 5' 2\" (1.575 m) 04/07/2017 98% No    BMI (Body Mass Index)                   49.02 kg/m2            Blood Pressure from Last 3 Encounters:   05/25/17 112/62   04/07/17 148/87   02/09/17 118/67    Weight from Last 3 Encounters:   05/25/17 268 lb (121.6 kg)   02/21/08 200 lb (90.7 kg)   02/08/08 203 lb 8 oz (92.3 kg)              We Performed the Following     Pain Drug Scr UR W Rptd Meds     UA with Microscopic reflex to Culture          Today's Medication Changes          These changes are accurate as of: 5/25/17  4:33 PM.  If you have any questions, ask your nurse or doctor.               Start taking these medicines.        Dose/Directions    atorvastatin 10 MG tablet   Commonly known as:  LIPITOR   Used for:  Mixed hyperlipidemia   Started by:  Mary Rizo PA        Dose:  10 mg   Take 1 tablet (10 mg) by mouth daily   Quantity:  30 tablet   Refills:  1         These medicines have changed or have updated prescriptions.        Dose/Directions    HYDROcodone-acetaminophen  MG per tablet   Commonly known as:  NORCO   This may have " changed:    - how much to take  - when to take this   Used for:  Chronic pain syndrome   Changed by:  Mary Rizo PA        Dose:  1 tablet   Take 1 tablet by mouth every 8 hours as needed   Quantity:  60 tablet   Refills:  0            Where to get your medicines      These medications were sent to [facility] [address], [place] - [address]  [address], [facility] [address]     Phone:  [phone]     atorvastatin 10 MG tablet         Some of these will need a paper prescription and others can be bought over the counter.  Ask your nurse if you have questions.     Bring a paper prescription for each of these medications     HYDROcodone-acetaminophen  MG per tablet                Primary Care Provider Office Phone # Fax #    Santana Cota [phone]       [facility] [address]  [facility] [address]        Thank you!     Thank you for choosing [facility]  for your care. Our goal is always to provide you with excellent care. Hearing back from our patients is one way we can continue to improve our services. Please take a few minutes to complete the written survey that you may receive in the mail after your visit with us. Thank you!             Your Updated Medication List - Protect others around you: Learn how to safely use, store and throw away your medicines at www.disposemymeds.org.          This list is accurate as of: 5/25/17  4:33 PM.  Always use your most recent med list.                   Brand Name Dispense Instructions for use    amLODIPine 5 MG tablet    NORVASC     Take 5 mg by mouth daily       atorvastatin 10 MG tablet    LIPITOR    30 tablet    Take 1 tablet (10 mg) by mouth daily       blood glucose lancing device          blood glucose monitoring test strip    no brand specified         citalopram 40 MG tablet    celeXA     Take 40 mg by mouth daily       clonazePAM 1 MG tablet    klonoPIN      Take 1 mg by mouth 3 times daily as needed       COPAXONE 20 MG/ML injection   Generic drug:  glatiramer      Inject 20 mg Subcutaneous daily       cyclobenzaprine 10 MG tablet    FLEXERIL     Take 10 mg by mouth as needed Take 1 tablet by mouth 3 times daily if needed       glipiZIDE 5 MG tablet    GLUCOTROL     Take 5 mg by mouth daily       hydrochlorothiazide 12.5 MG Tabs tablet      Take 12.5 mg by mouth daily       HYDROcodone-acetaminophen  MG per tablet    NORCO    60 tablet    Take 1 tablet by mouth every 8 hours as needed       lisinopril 10 MG tablet    PRINIVIL/ZESTRIL     Take 10 mg by mouth daily       LORazepam 1 MG tablet    ATIVAN    4 tablet    Take 1 tablet (1 mg) by mouth every 8 hours as needed for anxiety Take 30 minutes prior to departure.  Do not operate a vehicle after taking this medication       metFORMIN 500 MG 24 hr tablet    GLUCOPHAGE-XR     Take 1,000 mg by mouth daily       potassium chloride 20 MEQ CR tablet   Generic drug:  potassium chloride SA      Take 20 mEq by mouth daily       valACYclovir 1000 mg tablet    VALTREX     Take 2,000 mg by mouth 2 times daily as needed       vitamin D 53342 UNIT capsule    ERGOCALCIFEROL     Take 50,000 Units by mouth daily       zaleplon 10 MG capsule    SONATA     Take 10 mg by mouth daily

## 2017-05-25 NOTE — NURSING NOTE
"Chief Complaint   Patient presents with     Establish Care     *_* Health Care Directive *_*     declined       Initial /62 (BP Location: Left arm, Patient Position: Chair, Cuff Size: Adult Large)  Pulse 94  Temp 97.5  F (36.4  C) (Tympanic)  Ht 5' 2\" (1.575 m)  Wt 268 lb (121.6 kg)  LMP 04/07/2017  SpO2 98%  Breastfeeding? No  BMI 49.02 kg/m2 Estimated body mass index is 49.02 kg/(m^2) as calculated from the following:    Height as of this encounter: 5' 2\" (1.575 m).    Weight as of this encounter: 268 lb (121.6 kg).  Medication Reconciliation: complete   Kisha Wall CMA(AAMA)   "
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
## 2017-05-25 NOTE — LETTER
[facility]    05/25/17    Patient: Carin Shea  YOB: 1969  Medical Record Number: [id number]                                                                  Controlled Substance Agreement  I understand that my care provider has prescribed controlled substances (narcotics, tranquilizers, and/or stimulants) to help manage my condition(s).  I am taking this medicine to help me function or work.  I know that this is strong medicine.  It could have serious side effects and even cause a dependency on the drug.  If I stop these medicines suddenly, I could have severe withdrawal symptoms.    The risks, benefits, and side effects of these medication(s) were explained to me.  I agree that:  1. I will take part in other treatments as advised by my provider.  This may be psychiatry or counseling, physical therapy, behavioral therapy, group treatment, or a referral to a pain clinic.  I will reduce or stop my medicine when my provider tells me to do so.   2. I will take my medicines as prescribed.  I will not change the dose or schedule unless my provider tells me to.  There will be no refills if I  run out early.   I may be contacted at any time without warning and asked to complete a drug test or pill count.   3. I will keep all my appointments at the clinic.  If I miss appointments or fail to follow instructions, my provider may stop my medicine.  4. I will not ask other providers to prescribe controlled substances. And I will not accept controlled substances from other people. If I need another prescribed controlled substance for a new reason, I will notify my provider within one business day.  5. If I enroll in the [place] Medical Marijuana program, I will tell my provider.  I will also sign an agreement to share my medical records with my provider.  6. I will use one pharmacy to fill all of my controlled substance prescriptions.  If my prescription is mailed to my pharmacy, it may take  5 to 7 days for my medicine to be ready.  7. I understand that my provider, clinic care team, and pharmacy can track controlled substance prescriptions from other providers through a central database (prescription monitoring program).  8. I will bring in my list of medications (or my medicine bottles) each time I come to the clinic.  REV- 04/2016                                                                                                                                            Page 1 of 2      [facility]    05/25/17    Patient: Carin Shea  YOB: 1969  Medical Record Number: [id number]    9. Refills of controlled substances will be made only during office hours.  It is up to me to make sure that I do not run out of my medicines on weekends or holidays.    10. I am responsible for my prescriptions.  If the medicine is lost or stolen, it will not be replaced.   I also agree not to share these medicines with anyone.  11. I agree to not use ANY illegal or recreational drugs.  This includes marijuana, cocaine, bath salts or other drugs.  I agree not to use alcohol unless my provider says I may.  I agree to give urine samples whenever asked.  If I fail to give a urine sample, the provider may stop my medicine.     12. I will tell my nurse or provider right away if I become pregnant or have a new medical problem treated outside of [facility].  13. I understand that this medicine can affect my thinking and judgment.  It may be unsafe for me to drive, use machinery and do dangerous tasks.  I will not do any of these things until I know how the medicine affects me.  If my dose changes, I will wait to see how it affects me.  I will contact my provider if I have concerns about medicine side effects.  I understand that if I do not follow any of the conditions above, my prescriptions or treatment may be stopped.    I agree that my provider, clinic care team, and pharmacy may work with  any city, state or federal law enforcement agency that investigates the misuse, sale, or other diversion of my controlled medicine. I will allow my provider to discuss my care with or share a copy of this agreement with any other treating provider, pharmacy or emergency room where I receive care.  I agree to give up (waive) any right of privacy or confidentiality with respect to these authorizations.   I have read this agreement and have asked questions about anything I did not understand.   ___________________________________    ___________________________  Patient Signature                                                           Date and Time  ___________________________________     ____________________________  Witness                                                                            Date and Time  ___________________________________  APOLINAR Jain  REV-  04/2016                                                                                                                                                                 Page 2 of 2

## 2017-05-25 NOTE — PATIENT INSTRUCTIONS
Psychologists/ counselors  [facility] Russell  [phone]  Dr. Rhett Bernardo [phone]  [facility]  [phone]  [facility] [phone]  Blane Whitlock  [phone]   [employer]  [phone]  (kids)  [employer] [phone]  (teens)  [facility]      [phone]  [facility] [phone]    [facility]     [phone]   [facility] counseling [phone]  Den Clemons [phone]  Penny Rodriguez [phone]  [facility]     [phone]  [facility]     [phone]  [facility]     [phone]  [place]  Adi Fraga  [phone]  [facility]     [phone]  [facility] Dr. PASTOR Tomlinson     [phone]

## 2017-05-26 ENCOUNTER — TELEPHONE (OUTPATIENT)
Dept: FAMILY MEDICINE | Facility: OTHER | Age: 47
End: 2017-05-26

## 2017-05-26 DIAGNOSIS — G89.4 CHRONIC PAIN SYNDROME: ICD-10-CM

## 2017-05-26 DIAGNOSIS — E11.65 TYPE 2 DIABETES MELLITUS WITH HYPERGLYCEMIA, WITHOUT LONG-TERM CURRENT USE OF INSULIN (H): ICD-10-CM

## 2017-05-26 DIAGNOSIS — E78.2 MIXED HYPERLIPIDEMIA: ICD-10-CM

## 2017-05-26 DIAGNOSIS — G35 MULTIPLE SCLEROSIS (H): ICD-10-CM

## 2017-05-26 DIAGNOSIS — I10 BENIGN ESSENTIAL HYPERTENSION: ICD-10-CM

## 2017-05-26 LAB
ALBUMIN SERPL-MCNC: 3.9 G/DL (ref 3.4–5)
ALP SERPL-CCNC: 71 U/L (ref 40–150)
ALT SERPL W P-5'-P-CCNC: 83 U/L (ref 0–50)
ANION GAP SERPL CALCULATED.3IONS-SCNC: 11 MMOL/L (ref 3–14)
AST SERPL W P-5'-P-CCNC: 56 U/L (ref 0–45)
BASOPHILS # BLD AUTO: 0.1 10E9/L (ref 0–0.2)
BASOPHILS NFR BLD AUTO: 1.2 %
BILIRUB SERPL-MCNC: 0.5 MG/DL (ref 0.2–1.3)
BUN SERPL-MCNC: 13 MG/DL (ref 7–30)
CALCIUM SERPL-MCNC: 8.9 MG/DL (ref 8.5–10.1)
CHLORIDE SERPL-SCNC: 104 MMOL/L (ref 94–109)
CHOLEST SERPL-MCNC: 153 MG/DL
CO2 SERPL-SCNC: 24 MMOL/L (ref 20–32)
CREAT SERPL-MCNC: 0.58 MG/DL (ref 0.52–1.04)
CREAT UR-MCNC: 188 MG/DL
DIFFERENTIAL METHOD BLD: NORMAL
EOSINOPHIL # BLD AUTO: 0.4 10E9/L (ref 0–0.7)
EOSINOPHIL NFR BLD AUTO: 3.8 %
ERYTHROCYTE [DISTWIDTH] IN BLOOD BY AUTOMATED COUNT: 12.7 % (ref 10–15)
EST. AVERAGE GLUCOSE BLD GHB EST-MCNC: 209 MG/DL
GFR SERPL CREATININE-BSD FRML MDRD: ABNORMAL ML/MIN/1.7M2
GLUCOSE SERPL-MCNC: 180 MG/DL (ref 70–99)
HBA1C MFR BLD: 8.9 % (ref 4.3–6)
HCT VFR BLD AUTO: 42.2 % (ref 35–47)
HDLC SERPL-MCNC: 42 MG/DL
HGB BLD-MCNC: 14.6 G/DL (ref 11.7–15.7)
IMM GRANULOCYTES # BLD: 0 10E9/L (ref 0–0.4)
IMM GRANULOCYTES NFR BLD: 0.2 %
LDLC SERPL CALC-MCNC: 58 MG/DL
LYMPHOCYTES # BLD AUTO: 2.9 10E9/L (ref 0.8–5.3)
LYMPHOCYTES NFR BLD AUTO: 31.5 %
MCH RBC QN AUTO: 30 PG (ref 26.5–33)
MCHC RBC AUTO-ENTMCNC: 34.6 G/DL (ref 31.5–36.5)
MCV RBC AUTO: 87 FL (ref 78–100)
MICROALBUMIN UR-MCNC: 35 MG/L
MICROALBUMIN/CREAT UR: 18.67 MG/G CR (ref 0–25)
MONOCYTES # BLD AUTO: 0.9 10E9/L (ref 0–1.3)
MONOCYTES NFR BLD AUTO: 9.3 %
NEUTROPHILS # BLD AUTO: 5 10E9/L (ref 1.6–8.3)
NEUTROPHILS NFR BLD AUTO: 54 %
NONHDLC SERPL-MCNC: 111 MG/DL
NRBC # BLD AUTO: 0 10*3/UL
NRBC BLD AUTO-RTO: 0 /100
PLATELET # BLD AUTO: 340 10E9/L (ref 150–450)
POTASSIUM SERPL-SCNC: 4.2 MMOL/L (ref 3.4–5.3)
PROT SERPL-MCNC: 7.9 G/DL (ref 6.8–8.8)
RBC # BLD AUTO: 4.87 10E12/L (ref 3.8–5.2)
SODIUM SERPL-SCNC: 139 MMOL/L (ref 133–144)
TRIGL SERPL-MCNC: 267 MG/DL
TSH SERPL DL<=0.005 MIU/L-ACNC: 1.95 MU/L (ref 0.4–4)
WBC # BLD AUTO: 9.3 10E9/L (ref 4–11)

## 2017-05-26 PROCEDURE — 83036 HEMOGLOBIN GLYCOSYLATED A1C: CPT | Performed by: PHYSICIAN ASSISTANT

## 2017-05-26 PROCEDURE — 80050 GENERAL HEALTH PANEL: CPT | Performed by: PHYSICIAN ASSISTANT

## 2017-05-26 PROCEDURE — 80061 LIPID PANEL: CPT | Performed by: PHYSICIAN ASSISTANT

## 2017-05-26 PROCEDURE — 82043 UR ALBUMIN QUANTITATIVE: CPT | Performed by: PHYSICIAN ASSISTANT

## 2017-05-26 PROCEDURE — 36415 COLL VENOUS BLD VENIPUNCTURE: CPT | Performed by: PHYSICIAN ASSISTANT

## 2017-05-26 ASSESSMENT — PATIENT HEALTH QUESTIONNAIRE - PHQ9: SUM OF ALL RESPONSES TO PHQ QUESTIONS 1-9: 3

## 2017-05-26 ASSESSMENT — ANXIETY QUESTIONNAIRES: GAD7 TOTAL SCORE: 1

## 2017-05-26 NOTE — TELEPHONE ENCOUNTER
PCP Sent Vitamin D 50,000 to [facility] 5.25.17 and this was supposed to be Vitamin d3 5000 units.  Changed medication on med list and [facility] aware of change.

## 2017-05-26 NOTE — TELEPHONE ENCOUNTER
Walked RX to [facility] registration for , patient notified via voicemail.

## 2017-05-31 ENCOUNTER — TELEPHONE (OUTPATIENT)
Dept: FAMILY MEDICINE | Facility: OTHER | Age: 47
End: 2017-05-31

## 2017-05-31 NOTE — TELEPHONE ENCOUNTER
9:00 AM    Reason for Call: Phone Call    Description: Patient would like to speak to nurse regarding 3 medications, would not say what the issue was, just medications    Was an appointment offered for this call? No    Preferred method for responding to this message: Telephone Call [phone]    If we cannot reach you directly, may we leave a detailed response at the number you provided? Yes    Can this message wait until your PCP/provider returns, if available today? YES    Marisol Rose

## 2017-06-01 ENCOUNTER — AMBULATORY - GICH (OUTPATIENT)
Dept: ORTHOPEDICS | Facility: OTHER | Age: 47
End: 2017-06-01

## 2017-06-01 DIAGNOSIS — S92.254D: ICD-10-CM

## 2017-06-01 LAB — PAIN DRUG SCR UR W RPTD MEDS: NORMAL

## 2017-06-02 NOTE — TELEPHONE ENCOUNTER
Spoke to patient and explained Ativan would not be given as is taking Klonopin daily and can't be on both. She then states she didn't get her Klonopin either. This is showing was printed out and she stated she never received. I also brought up fact that her urine drug screen was negative for both of these medications. She got upset and stated that was because she had been out of them for awhile and the provider knew this. (Conversation earlier she states she was taking ativan 3 times a day and had for years, was out and didn't want to have withdrawals or get sick by not taking it over the weekend) Told her I would have to speak to provider and call her back.  Chart was searched and reviewed repeatedly. Clinic was searched for written Rx's. Patient had other pharmacies listed in chart. I called them and found Rx at [facility] in [place]. Had them cancel the Rx and I then called it into [facility]. Pharmacist reviewed all medications they received for her with me. The Duplicate Hydrocodone Rx was cancelled. Provider notified. Missing Ativan Rx was never found. This medication was d/c from her medication list as per [place] drug web site only had this medication filled once for 4 pills on 4/9 and once on 9/28/16 for 2 pills. Once from a neurologist and once from and ER provider.   I called patient back and left message the Rx was transferred to [facility]. Told she would need to make a follow up appt for next refills so everything could be documented on where Rx's were sent so this doesn't happen again. Per Mary Rizo.  Of note tracking these Rx's and trying to figure out which medication she needed, reviewing urine drug screens and reviewing last controlled substance refills for this patient took staff over 2 hours of time to complete.  Suri Rincon LPN

## 2017-06-02 NOTE — TELEPHONE ENCOUNTER
Patient called back wondering if this was changed and filled yet, states getting scared she won't get it. In the med sig it states to take before departure. Patient states was on 3 daily for years.  Of note she also has Rx for Klonopin that was filled at this visit with you.  Suri Rincon LPN

## 2017-06-02 NOTE — TELEPHONE ENCOUNTER
Can't not be on 2 benzo's.  Will not fill.  The Klonopin looked to be long term the Ativan for pre procedure or pre event such as air plane travel.  I did explain to her this is quite a large amount of medication.   This will not cause serious death as she is still on Klonopin.

## 2017-06-02 NOTE — TELEPHONE ENCOUNTER
Patient called asking where her Ativan Rx was. States she received 2 Rx's for pain meds but nothing for her Ativan. Rx was reordered at printed at visit for a quantity of 4. She states she didn't get it and is out. Also wondering why only 4 were given, has been on this for years and takes three daily. Please advise.  Suri Rincon LPN

## 2017-06-07 ENCOUNTER — TELEPHONE (OUTPATIENT)
Dept: FAMILY MEDICINE | Facility: OTHER | Age: 47
End: 2017-06-07

## 2017-06-07 NOTE — TELEPHONE ENCOUNTER
Left message for patient to call back. Per Mary Rizo she would like patient to come in for a narcotic and Benzo pill count.  Suri Rincon LPN     disoriented

## 2017-06-07 NOTE — TELEPHONE ENCOUNTER
Patient notified she needs to come in for a pill count. Is working now, will come in tomorrow at 3:45 for nurse only. Medications to be counted are both narcotic and benzo's.  Suri Rincon LPN

## 2017-06-08 ENCOUNTER — ALLIED HEALTH/NURSE VISIT (OUTPATIENT)
Dept: FAMILY MEDICINE | Facility: OTHER | Age: 47
End: 2017-06-08
Attending: PHYSICIAN ASSISTANT
Payer: COMMERCIAL

## 2017-06-08 DIAGNOSIS — G89.4 CHRONIC PAIN SYNDROME: Primary | ICD-10-CM

## 2017-06-08 NOTE — MR AVS SNAPSHOT
After Visit Summary   6/8/2017    Carin Shea    MRN: [id number]           Patient Information     Date Of Birth          7/17/1969        Visit Information        Provider Department      6/8/2017 3:45 PM HC FP NURSE [facility]        Today's Diagnoses     Chronic pain syndrome    -  1       Follow-ups after your visit        Your next 10 appointments already scheduled     Jun 26, 2017  2:45 PM CDT   (Arrive by 2:30 PM)   Office Visit with APOLINAR Narvaez   [facility] ([facility] [place] [facility])    [address]  [place] [address]   [phone]              Who to contact     If you have questions or need follow up information about today's clinic visit or your schedule please contact [facility] directly at [phone].  Normal or non-critical lab and imaging results will be communicated to you by MyChart, letter or phone within 4 business days after the clinic has received the results. If you do not hear from us within 7 days, please contact the clinic through MyChart or phone. If you have a critical or abnormal lab result, we will notify you by phone as soon as possible.  Submit refill requests through [employer] or call your pharmacy and they will forward the refill request to us. Please allow 3 business days for your refill to be completed.          Additional Information About Your Visit        Care EveryWhere ID     This is your Care EveryWhere ID. This could be used by other organizations to access your [place] medical records  [id number]        Your Vitals Were     Last Period                   04/07/2017            Blood Pressure from Last 3 Encounters:   05/25/17 112/62   04/07/17 148/87   02/09/17 118/67    Weight from Last 3 Encounters:   05/25/17 268 lb (121.6 kg)   02/21/08 200 lb (90.7 kg)   02/08/08 203 lb 8 oz (92.3 kg)              Today, you had the following     No orders found for display       Primary Care Provider Office  Phone # Fax #    Santana Cota [phone]       [facility] [address]  [place] [address]        Thank you!     Thank you for choosing [facility]  for your care. Our goal is always to provide you with excellent care. Hearing back from our patients is one way we can continue to improve our services. Please take a few minutes to complete the written survey that you may receive in the mail after your visit with us. Thank you!             Your Updated Medication List - Protect others around you: Learn how to safely use, store and throw away your medicines at www.disposemymeds.org.          This list is accurate as of: 6/8/17  4:02 PM.  Always use your most recent med list.                   Brand Name Dispense Instructions for use    amLODIPine 5 MG tablet    NORVASC    30 tablet    Take 1 tablet (5 mg) by mouth daily       atorvastatin 10 MG tablet    LIPITOR    30 tablet    Take 1 tablet (10 mg) by mouth daily       blood glucose lancing device     1 each    Check blood glucose bid       blood glucose monitoring test strip    no brand specified    180 strip    Check blood glucose bid       cholecalciferol 5000 UNITS Caps capsule    vitamin D3    30 capsule    Take 1 capsule (5,000 Units) by mouth daily       citalopram 40 MG tablet    celeXA    30 tablet    Take 1 tablet (40 mg) by mouth daily       clonazePAM 1 MG tablet    klonoPIN    90 tablet    Take 1 tablet (1 mg) by mouth 3 times daily as needed       COPAXONE 20 MG/ML injection   Generic drug:  glatiramer      Inject 20 mg Subcutaneous daily       cyclobenzaprine 10 MG tablet    FLEXERIL    90 tablet    Take 1 tablet (10 mg) by mouth as needed Take 1 tablet by mouth 3 times daily if needed       glipiZIDE 5 MG tablet    GLUCOTROL    30 tablet    Take 1 tablet (5 mg) by mouth daily       hydrochlorothiazide 12.5 MG Tabs tablet     60 tablet    Take 1 tablet (12.5 mg) by mouth daily        HYDROcodone-acetaminophen  MG per tablet    NORCO    60 tablet    Take 1 tablet by mouth every 8 hours as needed       lisinopril 10 MG tablet    PRINIVIL/ZESTRIL    30 tablet    Take 1 tablet (10 mg) by mouth daily       metFORMIN 500 MG 24 hr tablet    GLUCOPHAGE-XR    60 tablet    Take 2 tablets (1,000 mg) by mouth daily       potassium chloride SA 20 MEQ CR tablet    potassium chloride    30 tablet    Take 1 tablet (20 mEq) by mouth daily       valACYclovir 1000 mg tablet    VALTREX    21 tablet    Take 2 tablets (2,000 mg) by mouth 2 times daily as needed       zaleplon 10 MG capsule    SONATA    30 capsule    Take 1 capsule (10 mg) by mouth daily

## 2017-06-08 NOTE — NURSING NOTE
Medication Norco  Filled 5/26/2017- #60  # of pills counted left- 33    Medication Klonopin  Filled 06/02/2017 #90  # of pills counted left- 63    Patient states that she has been taking 2 tablets of the Norco at a time otherwise she can not get though work shift.     MARQUES CARBONE

## 2017-06-09 ENCOUNTER — TELEPHONE (OUTPATIENT)
Dept: FAMILY MEDICINE | Facility: OTHER | Age: 47
End: 2017-06-09

## 2017-06-09 NOTE — PROGRESS NOTES
Patient has used 27 Klonopin in 6 days. Rx is written for 1 every 8 hrs. This would be only 18 pills max should have been used.   Suri Rincon LPN

## 2017-06-09 NOTE — TELEPHONE ENCOUNTER
Patient notified she has over used her Klonopin Rx. She states she knows she has. Mary had cut her dose down so what did we expect. I explained that per the contract and discussion with Mary she was to only use 3 tablets a day. Explained Due to the increased use Mary is not willing to refill this medication and she will need to use the remaining tabs she has to ween off this medication. Patient is very upset states she will just find another provider then, that she had been on this medication for years and she cant just change her medications on her. She needs this medication and it can't just be decreased after using it for so long. I explained if she wanted to discuss further she should make an appt to come in and discuss with provider as I was just instructed to call her with this information. She states she is filing a grievance against her. She can't just change medications. 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 I again advised her to make a follow up to discuss but patient hung up on me.  Suri Rincon LPN

## 2017-06-09 NOTE — TELEPHONE ENCOUNTER
Pill count is off by 3 days of a full script.  She is over using her Klonopin.   She will not be given a refill.  She needs to decrease her medication to 2 per day for a week then one per day for a week then off. Her urine was absent of the medication so I am not really worried about her going through withdrawal.

## 2017-06-09 NOTE — TELEPHONE ENCOUNTER
Please note patient has used 27 Klonopin in 6 days. Rx is written for 1 every 8 hrs. This would be only 18 pills max should have been used.   Suri Rincon LPN

## 2017-06-14 NOTE — TELEPHONE ENCOUNTER
Klonopin D/C'd from pt's med list.  Also updated care coordination note stating no more benzodiazepines.    Radha Gil, RN-BSN  Chronic Pain Care Coordinator  [phone]

## 2017-06-24 ENCOUNTER — HEALTH MAINTENANCE LETTER (OUTPATIENT)
Age: 47
End: 2017-06-24

## 2017-07-17 ENCOUNTER — HISTORY (OUTPATIENT)
Dept: EMERGENCY MEDICINE | Facility: OTHER | Age: 47
End: 2017-07-17

## 2017-07-28 ENCOUNTER — HISTORY (OUTPATIENT)
Dept: EMERGENCY MEDICINE | Facility: OTHER | Age: 47
End: 2017-07-28

## 2017-08-11 ENCOUNTER — OFFICE VISIT (OUTPATIENT)
Dept: FAMILY MEDICINE | Facility: OTHER | Age: 47
End: 2017-08-11
Attending: PHYSICIAN ASSISTANT
Payer: COMMERCIAL

## 2017-08-11 VITALS
WEIGHT: 259 LBS | RESPIRATION RATE: 16 BRPM | BODY MASS INDEX: 44.22 KG/M2 | SYSTOLIC BLOOD PRESSURE: 125 MMHG | DIASTOLIC BLOOD PRESSURE: 90 MMHG | OXYGEN SATURATION: 98 % | HEART RATE: 85 BPM | TEMPERATURE: 97.7 F | HEIGHT: 64 IN

## 2017-08-11 DIAGNOSIS — B34.9 VIRAL SYNDROME: ICD-10-CM

## 2017-08-11 DIAGNOSIS — R07.0 THROAT PAIN: Primary | ICD-10-CM

## 2017-08-11 PROBLEM — S92.254D: Status: ACTIVE | Noted: 2017-05-09

## 2017-08-11 PROBLEM — Z79.891 LONG TERM (CURRENT) USE OF OPIATE ANALGESIC: Status: ACTIVE | Noted: 2017-04-20

## 2017-08-11 PROBLEM — I10 ESSENTIAL HYPERTENSION: Status: ACTIVE | Noted: 2017-04-05

## 2017-08-11 PROBLEM — G47.01 INSOMNIA DUE TO MEDICAL CONDITION: Status: ACTIVE | Noted: 2017-04-05

## 2017-08-11 PROBLEM — E66.01 MORBID OBESITY WITH BMI OF 45.0-49.9, ADULT (H): Status: ACTIVE | Noted: 2017-04-05

## 2017-08-11 PROBLEM — E55.9 VITAMIN D INSUFFICIENCY: Status: ACTIVE | Noted: 2017-04-12

## 2017-08-11 PROBLEM — Z80.0 FAMILY HISTORY OF COLON CANCER IN MOTHER: Status: ACTIVE | Noted: 2017-04-05

## 2017-08-11 PROBLEM — F33.0 MILD EPISODE OF RECURRENT MAJOR DEPRESSIVE DISORDER (H): Status: ACTIVE | Noted: 2017-04-05

## 2017-08-11 PROBLEM — H46.9 OPTIC NEURITIS DUE TO MULTIPLE SCLEROSIS (H): Status: ACTIVE | Noted: 2017-04-05

## 2017-08-11 PROBLEM — G35 OPTIC NEURITIS DUE TO MULTIPLE SCLEROSIS (H): Status: ACTIVE | Noted: 2017-04-05

## 2017-08-11 LAB
BASOPHILS # BLD AUTO: 0.1 10E9/L (ref 0–0.2)
BASOPHILS NFR BLD AUTO: 1.2 %
DEPRECATED S PYO AG THROAT QL EIA: NORMAL
DIFFERENTIAL METHOD BLD: NORMAL
EOSINOPHIL # BLD AUTO: 0.2 10E9/L (ref 0–0.7)
EOSINOPHIL NFR BLD AUTO: 1.4 %
ERYTHROCYTE [DISTWIDTH] IN BLOOD BY AUTOMATED COUNT: 12.7 % (ref 10–15)
HCT VFR BLD AUTO: 41.8 % (ref 35–47)
HGB BLD-MCNC: 14.7 G/DL (ref 11.7–15.7)
LYMPHOCYTES # BLD AUTO: 2.8 10E9/L (ref 0.8–5.3)
LYMPHOCYTES NFR BLD AUTO: 26.9 %
MCH RBC QN AUTO: 30.6 PG (ref 26.5–33)
MCHC RBC AUTO-ENTMCNC: 35.2 G/DL (ref 31.5–36.5)
MCV RBC AUTO: 87 FL (ref 78–100)
MICRO REPORT STATUS: NORMAL
MONOCYTES # BLD AUTO: 0.9 10E9/L (ref 0–1.3)
MONOCYTES NFR BLD AUTO: 8.3 %
NEUTROPHILS # BLD AUTO: 6.5 10E9/L (ref 1.6–8.3)
NEUTROPHILS NFR BLD AUTO: 62.2 %
PLATELET # BLD AUTO: 302 10E9/L (ref 150–450)
RBC # BLD AUTO: 4.8 10E12/L (ref 3.8–5.2)
SPECIMEN SOURCE: NORMAL
WBC # BLD AUTO: 10.5 10E9/L (ref 4–11)

## 2017-08-11 PROCEDURE — 87880 STREP A ASSAY W/OPTIC: CPT | Performed by: PHYSICIAN ASSISTANT

## 2017-08-11 PROCEDURE — 87081 CULTURE SCREEN ONLY: CPT | Performed by: PHYSICIAN ASSISTANT

## 2017-08-11 PROCEDURE — 36415 COLL VENOUS BLD VENIPUNCTURE: CPT | Performed by: PHYSICIAN ASSISTANT

## 2017-08-11 PROCEDURE — 85025 COMPLETE CBC W/AUTO DIFF WBC: CPT | Performed by: PHYSICIAN ASSISTANT

## 2017-08-11 PROCEDURE — 99213 OFFICE O/P EST LOW 20 MIN: CPT | Performed by: PHYSICIAN ASSISTANT

## 2017-08-11 ASSESSMENT — PATIENT HEALTH QUESTIONNAIRE - PHQ9
5. POOR APPETITE OR OVEREATING: SEVERAL DAYS
SUM OF ALL RESPONSES TO PHQ QUESTIONS 1-9: 2

## 2017-08-11 ASSESSMENT — ANXIETY QUESTIONNAIRES
7. FEELING AFRAID AS IF SOMETHING AWFUL MIGHT HAPPEN: NOT AT ALL
IF YOU CHECKED OFF ANY PROBLEMS ON THIS QUESTIONNAIRE, HOW DIFFICULT HAVE THESE PROBLEMS MADE IT FOR YOU TO DO YOUR WORK, TAKE CARE OF THINGS AT HOME, OR GET ALONG WITH OTHER PEOPLE: NOT DIFFICULT AT ALL
3. WORRYING TOO MUCH ABOUT DIFFERENT THINGS: NOT AT ALL
1. FEELING NERVOUS, ANXIOUS, OR ON EDGE: NOT AT ALL
6. BECOMING EASILY ANNOYED OR IRRITABLE: NOT AT ALL
GAD7 TOTAL SCORE: 2
2. NOT BEING ABLE TO STOP OR CONTROL WORRYING: SEVERAL DAYS
5. BEING SO RESTLESS THAT IT IS HARD TO SIT STILL: NOT AT ALL

## 2017-08-11 ASSESSMENT — PAIN SCALES - GENERAL: PAINLEVEL: MODERATE PAIN (5)

## 2017-08-11 NOTE — MR AVS SNAPSHOT
"              After Visit Summary   8/11/2017    Carin Shea    MRN: [id number]           Patient Information     Date Of Birth          7/17/1969        Visit Information        Provider Department      8/11/2017 3:30 PM Maame Abbott PA [facility]        Today's Diagnoses     Throat pain    -  1    Viral syndrome           Follow-ups after your visit        Who to contact     If you have questions or need follow up information about today's clinic visit or your schedule please contact [facility] directly at [phone].  Normal or non-critical lab and imaging results will be communicated to you by MyChart, letter or phone within 4 business days after the clinic has received the results. If you do not hear from us within 7 days, please contact the clinic through MyChart or phone. If you have a critical or abnormal lab result, we will notify you by phone as soon as possible.  Submit refill requests through [employer] [facility] or call your pharmacy and they will forward the refill request to us. Please allow 3 business days for your refill to be completed.          Additional Information About Your Visit        Care EveryWhere ID     This is your Care EveryWhere ID. This could be used by other organizations to access your [place] medical records  [id number]        Your Vitals Were     Pulse Temperature Respirations Height Pulse Oximetry BMI (Body Mass Index)    85 97.7  F (36.5  C) (Tympanic) 16 5' 3.75\" (1.619 m) 98% 44.81 kg/m2       Blood Pressure from Last 3 Encounters:   08/11/17 125/90   05/25/17 112/62   04/07/17 148/87    Weight from Last 3 Encounters:   08/11/17 259 lb (117.5 kg)   05/25/17 268 lb (121.6 kg)   02/21/08 200 lb (90.7 kg)              We Performed the Following     Beta strep group A culture     CBC with platelets and differential     Rapid strep screen        Primary Care Provider Office Phone # Fax #    Santana Cota [phone] " [phone]       [facility] [address]  [place] [address]        Equal Access to Services     ZACARIAS SOSA : Hadii aad ku hadshakiralicia Navinali, wagioda luroberto carlosgregha, almitata kamaribethda brenton, jaren pinoin hayaahan chavezclaudio ardon jonh dixon. So [facility] [phone].    ATENCIÓN: Si habla español, tiene a quinn disposición servicios gratuitos de asistencia lingüística. Llame al [phone].    We comply with applicable federal civil rights laws and [place] laws. We do not discriminate on the basis of race, color, national origin, age, disability sex, sexual orientation or gender identity.            Thank you!     Thank you for choosing [facility]  for your care. Our goal is always to provide you with excellent care. Hearing back from our patients is one way we can continue to improve our services. Please take a few minutes to complete the written survey that you may receive in the mail after your visit with us. Thank you!             Your Updated Medication List - Protect others around you: Learn how to safely use, store and throw away your medicines at www.disposemymeds.org.          This list is accurate as of: 8/11/17  3:35 PM.  Always use your most recent med list.                   Brand Name Dispense Instructions for use Diagnosis    amLODIPine 5 MG tablet    NORVASC    30 tablet    Take 1 tablet (5 mg) by mouth daily    Type 2 diabetes mellitus with hyperglycemia, without long-term current use of insulin (H)       atorvastatin 10 MG tablet    LIPITOR    30 tablet    Take 1 tablet (10 mg) by mouth daily    Mixed hyperlipidemia       blood glucose lancing device     1 each    Check blood glucose bid    Type 2 diabetes mellitus with hyperglycemia, without long-term current use of insulin (H)       blood glucose monitoring test strip    no brand specified    180 strip    Check blood glucose bid    Type 2 diabetes mellitus with hyperglycemia, without long-term current use of insulin (H)        cholecalciferol 5000 UNITS Caps capsule    vitamin D3    30 capsule    Take 1 capsule (5,000 Units) by mouth daily    Type 2 diabetes mellitus with hyperglycemia, without long-term current use of insulin (H), Chronic pain syndrome, Multiple sclerosis (H)       COPAXONE 20 MG/ML injection   Generic drug:  glatiramer      Inject 20 mg Subcutaneous daily        hydrochlorothiazide 12.5 MG Tabs tablet     60 tablet    Take 1 tablet (12.5 mg) by mouth daily    Benign essential hypertension       HYDROcodone-acetaminophen  MG per tablet    NORCO    60 tablet    Take 1 tablet by mouth every 8 hours as needed    Chronic pain syndrome, Multiple sclerosis (H)       lisinopril 10 MG tablet    PRINIVIL/ZESTRIL    30 tablet    Take 1 tablet (10 mg) by mouth daily    Benign essential hypertension       potassium chloride SA 20 MEQ CR tablet    potassium chloride    30 tablet    Take 1 tablet (20 mEq) by mouth daily    Benign essential hypertension       SitaGLIPtin-MetFORMIN HCl  MG Tb24      Take 1 tablet by mouth daily
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
## 2017-08-11 NOTE — NURSING NOTE
"Chief Complaint   Patient presents with     Derm Problem     Pt has 2 co-worker who hand, foot and mouth. Pt has a ST, fever, fatigue, sores on her tongue and HA for 3 days. Pt has a hx of MS. Pt does not have a rash on her hands or feet.       Initial /90 (BP Location: Right arm, Patient Position: Chair, Cuff Size: Adult Large)  Pulse 85  Temp 97.7  F (36.5  C) (Tympanic)  Resp 16  Ht 5' 3.75\" (1.619 m)  Wt 259 lb (117.5 kg)  SpO2 98%  BMI 44.81 kg/m2 Estimated body mass index is 44.81 kg/(m^2) as calculated from the following:    Height as of this encounter: 5' 3.75\" (1.619 m).    Weight as of this encounter: 259 lb (117.5 kg).  Medication Reconciliation: complete   Karen Read    "

## 2017-08-12 ASSESSMENT — ANXIETY QUESTIONNAIRES: GAD7 TOTAL SCORE: 2

## 2017-08-13 LAB
BACTERIA SPEC CULT: NORMAL
MICRO REPORT STATUS: NORMAL
SPECIMEN SOURCE: NORMAL

## 2017-08-21 ENCOUNTER — APPOINTMENT (OUTPATIENT)
Dept: OCCUPATIONAL MEDICINE | Facility: OTHER | Age: 47
End: 2017-08-21

## 2017-09-18 ENCOUNTER — HOSPITAL ENCOUNTER (EMERGENCY)
Facility: HOSPITAL | Age: 47
Discharge: HOME | End: 2017-09-18
Attending: PHYSICIAN ASSISTANT | Admitting: PHYSICIAN ASSISTANT
Payer: COMMERCIAL

## 2017-09-18 VITALS
RESPIRATION RATE: 16 BRPM | OXYGEN SATURATION: 96 % | TEMPERATURE: 98 F | DIASTOLIC BLOOD PRESSURE: 103 MMHG | SYSTOLIC BLOOD PRESSURE: 144 MMHG | HEART RATE: 86 BPM

## 2017-09-18 DIAGNOSIS — R73.9 HYPERGLYCEMIA: ICD-10-CM

## 2017-09-18 DIAGNOSIS — M79.672 LEFT FOOT PAIN: ICD-10-CM

## 2017-09-18 DIAGNOSIS — R10.11 RUQ ABDOMINAL PAIN: ICD-10-CM

## 2017-09-18 LAB
ALBUMIN SERPL-MCNC: 3.7 G/DL (ref 3.4–5)
ALBUMIN UR-MCNC: 30 MG/DL
ALP SERPL-CCNC: 72 U/L (ref 40–150)
ALT SERPL W P-5'-P-CCNC: 129 U/L (ref 0–50)
ANION GAP SERPL CALCULATED.3IONS-SCNC: 7 MMOL/L (ref 3–14)
APPEARANCE UR: ABNORMAL
AST SERPL W P-5'-P-CCNC: 108 U/L (ref 0–45)
BACTERIA #/AREA URNS HPF: ABNORMAL /HPF
BASOPHILS # BLD AUTO: 0.1 10E9/L (ref 0–0.2)
BASOPHILS NFR BLD AUTO: 0.8 %
BILIRUB SERPL-MCNC: 0.7 MG/DL (ref 0.2–1.3)
BILIRUB UR QL STRIP: NEGATIVE
BUN SERPL-MCNC: 12 MG/DL (ref 7–30)
CALCIUM SERPL-MCNC: 8.8 MG/DL (ref 8.5–10.1)
CHLORIDE SERPL-SCNC: 104 MMOL/L (ref 94–109)
CO2 SERPL-SCNC: 26 MMOL/L (ref 20–32)
COLOR UR AUTO: YELLOW
CREAT SERPL-MCNC: 0.57 MG/DL (ref 0.52–1.04)
CRP SERPL-MCNC: 8.3 MG/L (ref 0–8)
DIFFERENTIAL METHOD BLD: NORMAL
EOSINOPHIL # BLD AUTO: 0.2 10E9/L (ref 0–0.7)
EOSINOPHIL NFR BLD AUTO: 1.7 %
ERYTHROCYTE [DISTWIDTH] IN BLOOD BY AUTOMATED COUNT: 12.5 % (ref 10–15)
EST. AVERAGE GLUCOSE BLD GHB EST-MCNC: 237 MG/DL
GFR SERPL CREATININE-BSD FRML MDRD: >90 ML/MIN/1.7M2
GLUCOSE SERPL-MCNC: 235 MG/DL (ref 70–99)
GLUCOSE UR STRIP-MCNC: 70 MG/DL
HBA1C MFR BLD: 9.9 % (ref 4.3–6)
HCT VFR BLD AUTO: 44.7 % (ref 35–47)
HGB BLD-MCNC: 15.6 G/DL (ref 11.7–15.7)
HGB UR QL STRIP: ABNORMAL
IMM GRANULOCYTES # BLD: 0 10E9/L (ref 0–0.4)
IMM GRANULOCYTES NFR BLD: 0.2 %
KETONES UR STRIP-MCNC: NEGATIVE MG/DL
LEUKOCYTE ESTERASE UR QL STRIP: ABNORMAL
LIPASE SERPL-CCNC: 206 U/L (ref 73–393)
LYMPHOCYTES # BLD AUTO: 2 10E9/L (ref 0.8–5.3)
LYMPHOCYTES NFR BLD AUTO: 22.7 %
MAGNESIUM SERPL-MCNC: 2 MG/DL (ref 1.6–2.3)
MCH RBC QN AUTO: 30.6 PG (ref 26.5–33)
MCHC RBC AUTO-ENTMCNC: 34.9 G/DL (ref 31.5–36.5)
MCV RBC AUTO: 88 FL (ref 78–100)
MONOCYTES # BLD AUTO: 0.6 10E9/L (ref 0–1.3)
MONOCYTES NFR BLD AUTO: 7 %
MUCOUS THREADS #/AREA URNS LPF: PRESENT /LPF
NEUTROPHILS # BLD AUTO: 6 10E9/L (ref 1.6–8.3)
NEUTROPHILS NFR BLD AUTO: 67.6 %
NITRATE UR QL: NEGATIVE
NRBC # BLD AUTO: 0 10*3/UL
NRBC BLD AUTO-RTO: 0 /100
PH UR STRIP: 5 PH (ref 4.7–8)
PLATELET # BLD AUTO: 294 10E9/L (ref 150–450)
POTASSIUM SERPL-SCNC: 3.8 MMOL/L (ref 3.4–5.3)
PROT SERPL-MCNC: 7.8 G/DL (ref 6.8–8.8)
RBC # BLD AUTO: 5.1 10E12/L (ref 3.8–5.2)
RBC #/AREA URNS AUTO: 14 /HPF (ref 0–2)
SODIUM SERPL-SCNC: 137 MMOL/L (ref 133–144)
SOURCE: ABNORMAL
SP GR UR STRIP: 1.02 (ref 1–1.03)
SQUAMOUS #/AREA URNS AUTO: 19 /HPF (ref 0–1)
TROPONIN I SERPL-MCNC: <0.015 UG/L (ref 0–0.04)
UROBILINOGEN UR STRIP-MCNC: NORMAL MG/DL (ref 0–2)
WBC # BLD AUTO: 8.9 10E9/L (ref 4–11)
WBC #/AREA URNS AUTO: 18 /HPF (ref 0–2)

## 2017-09-18 PROCEDURE — 25000128 H RX IP 250 OP 636: Performed by: PHYSICIAN ASSISTANT

## 2017-09-18 PROCEDURE — 99284 EMERGENCY DEPT VISIT MOD MDM: CPT | Mod: 25

## 2017-09-18 PROCEDURE — 85025 COMPLETE CBC W/AUTO DIFF WBC: CPT | Performed by: PHYSICIAN ASSISTANT

## 2017-09-18 PROCEDURE — 36415 COLL VENOUS BLD VENIPUNCTURE: CPT | Performed by: PHYSICIAN ASSISTANT

## 2017-09-18 PROCEDURE — 99284 EMERGENCY DEPT VISIT MOD MDM: CPT | Performed by: PHYSICIAN ASSISTANT

## 2017-09-18 PROCEDURE — 87086 URINE CULTURE/COLONY COUNT: CPT | Performed by: PHYSICIAN ASSISTANT

## 2017-09-18 PROCEDURE — 81001 URINALYSIS AUTO W/SCOPE: CPT | Performed by: PHYSICIAN ASSISTANT

## 2017-09-18 PROCEDURE — 83690 ASSAY OF LIPASE: CPT | Performed by: PHYSICIAN ASSISTANT

## 2017-09-18 PROCEDURE — 83735 ASSAY OF MAGNESIUM: CPT | Performed by: PHYSICIAN ASSISTANT

## 2017-09-18 PROCEDURE — 96374 THER/PROPH/DIAG INJ IV PUSH: CPT

## 2017-09-18 PROCEDURE — 80053 COMPREHEN METABOLIC PANEL: CPT | Performed by: PHYSICIAN ASSISTANT

## 2017-09-18 PROCEDURE — 40000788 ZZHCL STATISTIC ESTIMATED AVERAGE GLUCOSE: Performed by: PHYSICIAN ASSISTANT

## 2017-09-18 PROCEDURE — 86140 C-REACTIVE PROTEIN: CPT | Performed by: PHYSICIAN ASSISTANT

## 2017-09-18 PROCEDURE — 84484 ASSAY OF TROPONIN QUANT: CPT | Performed by: PHYSICIAN ASSISTANT

## 2017-09-18 PROCEDURE — 83036 HEMOGLOBIN GLYCOSYLATED A1C: CPT | Performed by: PHYSICIAN ASSISTANT

## 2017-09-18 RX ORDER — FENTANYL CITRATE 50 UG/ML
50 INJECTION, SOLUTION INTRAMUSCULAR; INTRAVENOUS ONCE
Status: COMPLETED | OUTPATIENT
Start: 2017-09-18 | End: 2017-09-18

## 2017-09-18 RX ORDER — HYDROCODONE BITARTRATE AND ACETAMINOPHEN 5; 325 MG/1; MG/1
1 TABLET ORAL EVERY 8 HOURS PRN
Qty: 8 TABLET | Refills: 0 | Status: SHIPPED | OUTPATIENT
Start: 2017-09-18 | End: 2017-12-08

## 2017-09-18 RX ORDER — CEPHALEXIN 500 MG/1
500 CAPSULE ORAL 3 TIMES DAILY
Qty: 21 CAPSULE | Refills: 0 | Status: SHIPPED | OUTPATIENT
Start: 2017-09-18 | End: 2017-09-25

## 2017-09-18 RX ORDER — LIDOCAINE 40 MG/G
CREAM TOPICAL
Status: DISCONTINUED | OUTPATIENT
Start: 2017-09-18 | End: 2017-09-18 | Stop reason: HOSPADM

## 2017-09-18 RX ADMIN — FENTANYL CITRATE 50 MCG: 50 INJECTION, SOLUTION INTRAMUSCULAR; INTRAVENOUS at 12:41

## 2017-09-18 ASSESSMENT — ENCOUNTER SYMPTOMS
VOMITING: 0
FATIGUE: 0
FLANK PAIN: 0
APPETITE CHANGE: 0
DIARRHEA: 0
DIZZINESS: 0
DIFFICULTY URINATING: 0
SEIZURES: 0
HEADACHES: 0
LIGHT-HEADEDNESS: 0
NUMBNESS: 1
FREQUENCY: 1
FEVER: 0
ACTIVITY CHANGE: 0
NAUSEA: 0
SHORTNESS OF BREATH: 0
ABDOMINAL PAIN: 0
CHILLS: 0
CHEST TIGHTNESS: 0

## 2017-09-18 NOTE — DISCHARGE INSTRUCTIONS
Follow-up with Maame Abbott tomorrow morning at 9:00 am.    Keflex for the foot.     Pain medications sparingly.     Return here for any worsening symptoms or other concerns.

## 2017-09-18 NOTE — ED NOTES
Patient verbalizes understanding of discharge instructions. Rates pain 5/10. Afebrile. Paper prescription for norco sent home with pt. Pt states she has a ride picking her up. Follow up tomorrow at 0900 with Maame Hawkins. Up ambulating in room without difficulty.

## 2017-09-18 NOTE — ED AVS SNAPSHOT
[facility]    [address] [address]    Phone:  [phone]                                       Carin Shea   MRN: [id number]    Department:  [facility]   Date of Visit:  9/18/2017           Patient Information     Date Of Birth          7/17/1969        Your diagnoses for this visit were:     RUQ abdominal pain     Left foot pain     Hyperglycemia     Uncontrolled type 2 diabetes mellitus without complication, without long-term current use of insulin (H)        You were seen by Jess Mariscal PA-C.      Follow-up Information     Follow up with Maame Abbott PA In 1 day.    Specialty:  Family Practice    Contact information:    [facility] [place]  [address]  [facility] [address]  [phone]          Follow up with [facility].    Specialty:  EMERGENCY MEDICINE    Why:  If symptoms worsen    Contact information:    [address] [address] [phone]    Additional information:    From [place]: Take [address]. Turn left at [address]. Turn left at the first stoplight on [address]. At the first stop sign, take a right onto [address]. Take a left into the parking lot and continue through until you reach the North enterance of the building.       From [place]: Take [address]. Take the [address] ramp towards [address] [place]. Turn left onto [address]. Take a slight right onto [address]. Turn left at the first stoplight on [address]. At the first stop sign, take a right onto [address]. Take a left into the parking lot and continue through until you reach the North enterance of the building.       From [place]: Take [address]. Take a right at [address]. At the first stop sign, take a right onto [address]. Take a left into the parking lot and continue through until you reach the North enterance of the building.          Discharge Instructions       Follow-up with Maame Abbott tomorrow morning at 9:00 am.    Keflex for the foot.     Pain medications sparingly.     Return here for any worsening symptoms or other concerns.     Future Appointments        Provider Department Dept Phone Center    9/19/2017 9:15 AM AP[facility]INAR [facility] [phone] [facility]         Review of your medicines      START taking        Dose / Directions Last dose taken    cephALEXin 500 MG capsule   Commonly known as:  KEFLEX   Dose:  500 mg   Quantity:  21 capsule        Take 1 capsule (500 mg) by mouth 3 times daily for 7 days   Refills:  0        HYDROcodone-acetaminophen 5-325 MG per tablet   Commonly known as:  NORCO   Dose:  1 tablet   Quantity:  8 tablet        Take 1 tablet by mouth every 8 hours as needed for moderate to severe pain   Refills:  0          Our records show that you are taking the medicines listed below. If these are incorrect, please call your family doctor or clinic.        Dose / Directions Last dose taken    atorvastatin 10 MG tablet   Commonly known as:  LIPITOR   Dose:  10 mg   Quantity:  30 tablet        Take 1 tablet (10 mg) by mouth daily   Refills:  1        blood glucose lancing device   Quantity:  1 each        Check blood glucose bid   Refills:  0        blood glucose monitoring test strip   Commonly known as:  no brand specified   Quantity:  180 strip        Check blood glucose bid   Refills:  3        cholecalciferol 5000 UNITS Caps capsule   Commonly known as:  vitamin D3   Dose:  5000 Units   Quantity:  30 capsule        Take 1 capsule (5,000 Units) by mouth daily   Refills:  5        COPAXONE 20 MG/ML injection   Dose:  20 mg   Generic drug:  glatiramer        Inject 20 mg Subcutaneous daily   Refills:  0        potassium chloride SA 20 MEQ CR tablet   Commonly known as:  potassium chloride   Dose:  20 mEq   Quantity:  30 tablet        Take 1 tablet (20 mEq) by mouth daily  "  Refills:  0        sitagliptin-metFORMIN  MG per tablet   Commonly known as:  JANUMET   Dose:  1 tablet        Take 1 tablet by mouth once   Refills:  0                Prescriptions were sent or printed at these locations (2 Prescriptions)                   [facility] [id number]   [address], [place] [address]    Telephone:  [phone]   Fax:  [phone]   Hours:                  E-Prescribed (1 of 1)         cephALEXin (KEFLEX) 500 MG capsule                     Other Prescriptions                Printed at Department/Unit printer (1 of 1)         HYDROcodone-acetaminophen (NORCO) 5-325 MG per tablet                Procedures and tests performed during your visit     CBC with platelets differential    CRP inflammation    Comprehensive metabolic panel    Estimated Average Glucose    Hemoglobin A1c    Lipase    Magnesium    Peripheral IV catheter    Troponin I    UA reflex to Microscopic      Orders Needing Specimen Collection     None      Pending Results     No orders found from 9/16/2017 to 9/19/2017.            Pending Culture Results     No orders found from 9/16/2017 to 9/19/2017.            Thank you for choosing [facility]       Thank you for choosing [facility] for your care. Our goal is always to provide you with excellent care. Hearing back from our patients is one way we can continue to improve our services. Please take a few minutes to complete the written survey that you may receive in the mail after you visit with us. Thank you!        [employer] Information     [employer] lets you send messages to your doctor, view your test results, renew your prescriptions, schedule appointments and more. To sign up, go to [email or web address] . Click on \"Log in\" on the left side of the screen, which will take you to the Welcome page. Then click on \"Sign up Now\" on the right side of the page.     You will be asked to enter the access code listed below, as well as some personal " information. Please follow the directions to create your username and password.     Your access code is: [email or web address]  Expires: 2017  1:35 PM     Your access code will  in 90 days. If you need help or a new code, please call your [facility] clinic or [phone].        Care EveryWhere ID     This is your Care EveryWhere ID. This could be used by other organizations to access your [facility] medical records  [id number]        Equal Access to Services     [facility] Hospital[facility]STOR : Hadii aad ku hadasho Soshanellali, waaxda luqadaha, qaybta kaalmada adeegyajoshua, jaren borja . So [facility] [phone].    ATENCIÓN: Si habla español, tiene a quinn disposición servicios gratuitos de asistencia lingüística. Llame al [phone].    We comply with applicable federal civil rights laws and [place] laws. We do not discriminate on the basis of race, color, national origin, age, disability sex, sexual orientation or gender identity.            After Visit Summary       This is your record. Keep this with you and show to your community pharmacist(s) and doctor(s) at your next visit.

## 2017-09-18 NOTE — ED NOTES
Presents to the ED with c/o elevated blood sugars X2 days and foot pain after stepping on a cell phone adaptor yesterday.  States she has not eaten because her blood sugar jumps to 488 every time she eats.  Pt c/o nausea without emesis.  C/O leg cramps, blurred vision., and numbness and tingling in bilateral arms and hands.  Up to the BR and able to give urine sample.  Assessment is complete.  Call ight is given.  BP and sat monitor on.
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
## 2017-09-18 NOTE — ED NOTES
"Pt reports to nurse she is having a lot of stress at home.  Her mother is terminal and is in hospice.  Reports her son is \"PATRIZIA\" and is a drug addict.  Pt is tearful.  Provided support to pt.   "

## 2017-09-18 NOTE — PROGRESS NOTES
Referral per Jess JIANG,  In to visit the patient who is an employee here at the hospital. She has re located from [place].  She is a unit assistant.  She was a  in [place].  I am informed she has uncontrolled diabetes, there are other family stressors.  She is open to information for counseling which was provided, as was information for a diabetic support MD, also provided was a contact name for a neurologist for her MS care.    I did also provide her with my contact in the event she wished for additional resources.

## 2017-09-18 NOTE — ED AVS SNAPSHOT
[facility]    [address] [address]    Phone:  [phone]                                       Carin Shea   MRN: [id number]    Department:  [facility]   Date of Visit:  9/18/2017           After Visit Summary Signature Page     I have received my discharge instructions, and my questions have been answered. I have discussed any challenges I see with this plan with the nurse or doctor.    ..........................................................................................................................................  Patient/Patient Representative Signature      ..........................................................................................................................................  Patient Representative Print Name and Relationship to Patient    ..................................................               ................................................  Date                                            Time    ..........................................................................................................................................  Reviewed by Signature/Title    ...................................................              ..............................................  Date                                                            Time

## 2017-09-19 LAB
BACTERIA SPEC CULT: ABNORMAL
SPECIMEN SOURCE: ABNORMAL

## 2017-11-30 ENCOUNTER — TELEPHONE (OUTPATIENT)
Dept: FAMILY MEDICINE | Facility: OTHER | Age: 47
End: 2017-11-30

## 2017-11-30 ENCOUNTER — OFFICE VISIT (OUTPATIENT)
Dept: FAMILY MEDICINE | Facility: OTHER | Age: 47
End: 2017-11-30
Attending: PHYSICIAN ASSISTANT
Payer: COMMERCIAL

## 2017-11-30 VITALS
WEIGHT: 264 LBS | HEIGHT: 61 IN | OXYGEN SATURATION: 98 % | BODY MASS INDEX: 49.84 KG/M2 | DIASTOLIC BLOOD PRESSURE: 98 MMHG | TEMPERATURE: 97 F | SYSTOLIC BLOOD PRESSURE: 144 MMHG | HEART RATE: 92 BPM

## 2017-11-30 DIAGNOSIS — Z12.31 ENCOUNTER FOR SCREENING MAMMOGRAM FOR BREAST CANCER: ICD-10-CM

## 2017-11-30 DIAGNOSIS — Z23 ENCOUNTER FOR IMMUNIZATION: Primary | ICD-10-CM

## 2017-11-30 DIAGNOSIS — N63.20 LEFT BREAST LUMP: ICD-10-CM

## 2017-11-30 DIAGNOSIS — L98.9 SKIN LESION: ICD-10-CM

## 2017-11-30 PROCEDURE — 90732 PPSV23 VACC 2 YRS+ SUBQ/IM: CPT | Performed by: PHYSICIAN ASSISTANT

## 2017-11-30 PROCEDURE — 90471 IMMUNIZATION ADMIN: CPT | Performed by: PHYSICIAN ASSISTANT

## 2017-11-30 PROCEDURE — 99213 OFFICE O/P EST LOW 20 MIN: CPT | Mod: 25 | Performed by: PHYSICIAN ASSISTANT

## 2017-11-30 ASSESSMENT — PAIN SCALES - GENERAL: PAINLEVEL: NO PAIN (0)

## 2017-11-30 ASSESSMENT — ANXIETY QUESTIONNAIRES
4. TROUBLE RELAXING: SEVERAL DAYS
6. BECOMING EASILY ANNOYED OR IRRITABLE: NOT AT ALL
2. NOT BEING ABLE TO STOP OR CONTROL WORRYING: SEVERAL DAYS
IF YOU CHECKED OFF ANY PROBLEMS ON THIS QUESTIONNAIRE, HOW DIFFICULT HAVE THESE PROBLEMS MADE IT FOR YOU TO DO YOUR WORK, TAKE CARE OF THINGS AT HOME, OR GET ALONG WITH OTHER PEOPLE: SOMEWHAT DIFFICULT
3. WORRYING TOO MUCH ABOUT DIFFERENT THINGS: NOT AT ALL
7. FEELING AFRAID AS IF SOMETHING AWFUL MIGHT HAPPEN: NOT AT ALL
GAD7 TOTAL SCORE: 4
5. BEING SO RESTLESS THAT IT IS HARD TO SIT STILL: SEVERAL DAYS
1. FEELING NERVOUS, ANXIOUS, OR ON EDGE: SEVERAL DAYS

## 2017-11-30 ASSESSMENT — PATIENT HEALTH QUESTIONNAIRE - PHQ9: SUM OF ALL RESPONSES TO PHQ QUESTIONS 1-9: 5

## 2017-11-30 NOTE — NURSING NOTE
"Chief Complaint   Patient presents with     Breast Mass     Lumps in breast        Initial BP (!) 144/98  Pulse 92  Temp 97  F (36.1  C) (Tympanic)  Ht 5' 0.75\" (1.543 m)  Wt 264 lb (119.7 kg)  SpO2 98%  BMI 50.29 kg/m2 Estimated body mass index is 50.29 kg/(m^2) as calculated from the following:    Height as of this encounter: 5' 0.75\" (1.543 m).    Weight as of this encounter: 264 lb (119.7 kg).  Medication Reconciliation: complete   Jemima Jarvis    "

## 2017-11-30 NOTE — TELEPHONE ENCOUNTER
Patient called and requested a refill on all medications. I left a detailed message for her to call her pharmacsist for the refill- per Dr. Abbott's response. Then the pharmacy will call her for the refill.   Jemima Jarvis

## 2017-11-30 NOTE — PROGRESS NOTES
Subjective:  Carin Shea is a 48 year old female who presents with a 3 day history of finding 2 lumps in left breast. No pain. Next wants a mole checked on back. Last mammogram 2 years ago in [place].        PMH, PSH, FH reviewed and unchanged    Current Outpatient Prescriptions   Medication     CLONAZEPAM PO     sitagliptin-metFORMIN (JANUMET)  MG per tablet     HYDROcodone-acetaminophen (NORCO) 5-325 MG per tablet     cholecalciferol (VITAMIN D3) 5000 UNITS CAPS capsule     atorvastatin (LIPITOR) 10 MG tablet     blood glucose (ACCU-CHEK FASTCLIX) lancing device     blood glucose monitoring (NO BRAND SPECIFIED) test strip     potassium chloride SA (POTASSIUM CHLORIDE) 20 MEQ CR tablet     glatiramer (COPAXONE) 20 MG/ML injection     No current facility-administered medications for this visit.        Allergies   Allergen Reactions     Codeine      Diphenhydramine      Other reaction(s): Agitation     Morphine GI Disturbance     Penicillins        Review of Systems:  Gen: negative for fever, chills, change in weight  Derm: See HPI       Objective:    B/P: 144/98, T: 97, P: 92, R: Data Unavailable    Physical Exam:  Constitutional: healthy, alert and no distress  Skin: 3 mm flesh tone skin lesion mid back. Benign appearance  Breasts are symmetric bilaterally. No nipple discharge. NTTP. No mass on right. 2 small lumps at 2 o'clock left breast periphery. NTTP. No axillary lymphadenopathy palpable bilateral.  Psych: Mood is euthymic with corresponding affect      Assessment and Plan  (Z23) Encounter for immunization  (primary encounter diagnosis)  Comment: Patient has MS.  Plan: PNEUMOCOCCAL VACCINE,ADULT,SQ OR IM, ADMIN 1st         VACCINE            (Z12.31) Encounter for screening mammogram for breast cancer  Plan: MA SCREENING DIGITAL RT ([place])            (N63.20) Left breast lump  Plan: MA Diagnostic Digital Left, US Breast Left         Limited 1-3 Quadrants            (L98.9) Skin  lesion  Comment: simply monitor            Follow up with worsening sx or failure to improve or with any questions or concerns.     Maame Abbott PA-C

## 2017-11-30 NOTE — MR AVS SNAPSHOT
"              After Visit Summary   11/30/2017    Carin Shea    MRN: [id number]           Patient Information     Date Of Birth          7/17/1969        Visit Information        Provider Department      11/30/2017 9:00 AM Maame Abbott PA [facility]        Today's Diagnoses     Encounter for immunization    -  1    Encounter for screening mammogram for breast cancer        Left breast lump        Skin lesion           Follow-ups after your visit        Future tests that were ordered for you today     Open Future Orders        Priority Expected Expires Ordered    MA Diagnostic Digital Left Routine  11/30/2018 11/30/2017            Who to contact     If you have questions or need follow up information about today's clinic visit or your schedule please contact [facility] directly at [phone].  Normal or non-critical lab and imaging results will be communicated to you by Spectrum5hart, letter or phone within 4 business days after the clinic has received the results. If you do not hear from us within 7 days, please contact the clinic through MyChart or phone. If you have a critical or abnormal lab result, we will notify you by phone as soon as possible.  Submit refill requests through GenieBelt or call your pharmacy and they will forward the refill request to us. Please allow 3 business days for your refill to be completed.          Additional Information About Your Visit        Spectrum5hart Information     GenieBelt lets you send messages to your doctor, view your test results, renew your prescriptions, schedule appointments and more. To sign up, go to [email or web address] . Click on \"Log in\" on the left side of the screen, which will take you to the Welcome page. Then click on \"Sign up Now\" on the right side of the page.     You will be asked to enter the access code listed below, as well as some personal information. Please follow the directions to create your username and " "password.     Your access code is: [email or web address]  Expires: 2017 12:35 PM     Your access code will  in 90 days. If you need help or a new code, please call your [place] clinic or [phone].        Care EveryWhere ID     This is your Care EveryWhere ID. This could be used by other organizations to access your [place] medical records  [id number]        Your Vitals Were     Pulse Temperature Height Pulse Oximetry BMI (Body Mass Index)       92 97  F (36.1  C) (Tympanic) 5' 0.75\" (1.543 m) 98% 50.29 kg/m2        Blood Pressure from Last 3 Encounters:   17 (!) 144/98   17 (!) 144/103   17 125/90    Weight from Last 3 Encounters:   17 264 lb (119.7 kg)   17 259 lb (117.5 kg)   17 268 lb (121.6 kg)              We Performed the Following     ADMIN 1st VACCINE     MA SCREENING DIGITAL RT ([place])     PNEUMOCOCCAL VACCINE,ADULT,SQ OR IM     SCREENING QUESTIONS FOR PED IMMUNIZATIONS     US Breast Left Limited 1-3 Quadrants        Primary Care Provider Office Phone # Fax #    APOLINAR Sawyer [phone]       [facility] [place] [address]  [place] [address]        Equal Access to Services     ZACARIAS SOSA AH: Hadii aad ku hadasho Soomaali, waaxda luqadaha, qaybta kaalmada adeegyada, jaren dasilva hayjanette dixon. So [facility] [phone].    ATENCIÓN: Si habla español, tiene a quinn disposición servicios gratuitos de asistencia lingüística. Llame al [phone].    We comply with applicable federal civil rights laws and [place] laws. We do not discriminate on the basis of race, color, national origin, age, disability, sex, sexual orientation, or gender identity.            Thank you!     Thank you for choosing [facility]  for your care. Our goal is always to provide you with excellent care. Hearing back from our patients is one way we can continue to improve our services. Please take a few minutes to complete the written " survey that you may receive in the mail after your visit with us. Thank you!             Your Updated Medication List - Protect others around you: Learn how to safely use, store and throw away your medicines at www.disposemymeds.org.          This list is accurate as of: 11/30/17  9:26 AM.  Always use your most recent med list.                   Brand Name Dispense Instructions for use Diagnosis    atorvastatin 10 MG tablet    LIPITOR    30 tablet    Take 1 tablet (10 mg) by mouth daily    Mixed hyperlipidemia       blood glucose lancing device     1 each    Check blood glucose bid    Type 2 diabetes mellitus with hyperglycemia, without long-term current use of insulin (H)       blood glucose monitoring test strip    no brand specified    180 strip    Check blood glucose bid    Type 2 diabetes mellitus with hyperglycemia, without long-term current use of insulin (H)       cholecalciferol 5000 UNITS Caps capsule    vitamin D3    30 capsule    Take 1 capsule (5,000 Units) by mouth daily    Type 2 diabetes mellitus with hyperglycemia, without long-term current use of insulin (H), Chronic pain syndrome, Multiple sclerosis (H)       CLONAZEPAM PO      Take 1 mg by mouth At Bedtime        COPAXONE 20 MG/ML injection   Generic drug:  glatiramer      Inject 20 mg Subcutaneous daily        HYDROcodone-acetaminophen 5-325 MG per tablet    NORCO    8 tablet    Take 1 tablet by mouth every 8 hours as needed for moderate to severe pain        potassium chloride SA 20 MEQ CR tablet    KLOR-CON    30 tablet    Take 1 tablet (20 mEq) by mouth daily    Benign essential hypertension       sitagliptin-metFORMIN  MG per tablet    JANUMET     Take 1 tablet by mouth once

## 2017-12-01 ASSESSMENT — ANXIETY QUESTIONNAIRES: GAD7 TOTAL SCORE: 4

## 2017-12-08 DIAGNOSIS — E78.2 MIXED HYPERLIPIDEMIA: ICD-10-CM

## 2017-12-08 DIAGNOSIS — I10 BENIGN ESSENTIAL HYPERTENSION: ICD-10-CM

## 2017-12-08 DIAGNOSIS — E11.65 TYPE 2 DIABETES MELLITUS WITH HYPERGLYCEMIA, WITHOUT LONG-TERM CURRENT USE OF INSULIN (H): ICD-10-CM

## 2017-12-08 RX ORDER — ATORVASTATIN CALCIUM 10 MG/1
10 TABLET, FILM COATED ORAL DAILY
Qty: 30 TABLET | Refills: 1 | Status: CANCELLED | OUTPATIENT
Start: 2017-12-08

## 2017-12-08 RX ORDER — POTASSIUM CHLORIDE 1500 MG/1
20 TABLET, EXTENDED RELEASE ORAL DAILY
Qty: 30 TABLET | Refills: 0 | Status: CANCELLED | OUTPATIENT
Start: 2017-12-08

## 2017-12-08 RX ORDER — CLONAZEPAM 1 MG/1
1 TABLET ORAL AT BEDTIME
Qty: 90 TABLET | Status: CANCELLED | OUTPATIENT
Start: 2017-12-08

## 2017-12-08 NOTE — TELEPHONE ENCOUNTER
Pt had requested refills of Janumet, Klonopin, Potassium, Lancets, Lipitor and test strips. I called the pt no answer x's 3. I left a message Mario did not feel comfortable refilling her medications. We have no record that Janumet has been Rx'd for her. Her pharmacy has refills available on both Metformin and Glipizide, she should fill that at this time. Her Klonopin requested was discontinued in June by Mary's office due to drug count problems on 06/08/17. The pharmacy has refills on her Lipitor and will fill this for her. I advised Mario recommends she contact [place] office on Monday to discuss her refills, but cannot fill any medications at this time.

## 2017-12-08 NOTE — TELEPHONE ENCOUNTER
Pt calls back with needed information. I called the pharmacy they have Rx's for Metformin and Glipizide but not Janumet.

## 2017-12-08 NOTE — TELEPHONE ENCOUNTER
Pt calls she was in last week and was told to call her pharmacy. The pharmacy stated they could not request as this was previously filled by a different out of state provider. Pt is out of her medication. Pt will call back with brands of test strips and lancets.

## 2017-12-11 NOTE — TELEPHONE ENCOUNTER
I called the pt she did get the message. I advised she establish care with a provider. She states she established care with Mario I advised her I find no record of this. Pt does not like her treatment and feels she is being pushed off. 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 She does not want to discuss any further. I reviewed reasons for both appt's neither states she is establishing care.

## 2017-12-28 ENCOUNTER — APPOINTMENT (OUTPATIENT)
Dept: OCCUPATIONAL MEDICINE | Facility: OTHER | Age: 47
End: 2017-12-28

## 2017-12-28 PROCEDURE — 99000 SPECIMEN HANDLING OFFICE-LAB: CPT

## 2018-01-03 NOTE — PROGRESS NOTES
Patient Information     Patient Name MRN Carin Dewey [id number] Female 1969      Progress Notes by Yenni Guillaume NP at 2017  2:15 PM     Author:  Yenni Guillaume NP Service:  (none) Author Type:  PHYS- Nurse Practitioner     Filed:  2017  4:53 PM Encounter Date:  2017 Status:  Signed     :  Yenni Guillaume NP (PHYS- Nurse Practitioner)            HPI:    Carin Shea is a 47 y.o. female who presents to clinic today for cold sores. Has had these for about 5 weeks. She has had multiple outbreaks over this time. Has had some lesions that come and go. Having increased stress recently due to relocating, new job, health issues. Tx with Abreva with no relief. Reports these are swollen and painful.     No past medical history on file.  No past surgical history on file.  Social History     Substance Use Topics       Smoking status: Never Smoker     Smokeless tobacco: Never Used     Alcohol use No     Current Outpatient Prescriptions       Medication  Sig Dispense Refill     amLODIPine (NORVASC) 5 mg tablet Take 1 tablet by mouth once daily.  0     citalopram (CELEXA) 40 mg tablet Take 1 tablet by mouth once daily.  0     clonazePAM (KLONOPIN) 1 mg tablet Take 1 tablet by mouth 3 times daily if needed.  0     cyclobenzaprine (FLEXERIL) 10 mg tablet Take 1 tablet by mouth 3 times daily if needed for Muscle Spasm. Take 1/2 to one tablet PRN  0     ergocalciferol (VITAMIN D2; DRISDOL) 50,000 unit capsule Take 1 capsule by mouth every Monday and Thursday.  0     glatiramer (COPAXONE) 20 mg/mL injection Inject 20 mg subcutaneous once daily.  0     hydroCHLOROthiazide 12.5 mg tablet Take 1 tablet by mouth once daily.  0     metFORMIN (GLUCOPHAGE) 500 mg tablet Take 2 tablets by mouth once daily with a meal.  0     zaleplon (SONATA) 10 mg capsule Take 1 Cap by mouth at bedtime.  0     No current facility-administered medications for this visit.      Medications have been reviewed by me  and are current to the best of my knowledge and ability.    Allergies     Allergen  Reactions     Diphenhydramine Agitation     Penicillins *Unknown - Childhood Rxn       ROS:  Pertinent positives and negatives are noted in HPI.    EXAM:  General appearance: well appearing female, in no acute distress  Head: normocephalic, atraumatic  Ears: TM's with cone of light, no erythema, canals clear bilaterally  Eyes: conjunctivae normal  Orophayrnx: moist mucous membranes, tonsils without erythema, exudates or petechiae, no post nasal drip seen  Neck: supple without adenopathy  Respiratory: clear to auscultation bilaterally  Cardiac: RRR with no murmurs  Dermatological: left outer lip with small, open cold sore  Psychological: normal affect, alert and pleasant    ASSESSMENT/PLAN:    ICD-10-CM    1. Cold sore B00.1 valACYclovir (VALTREX) 1 gram tablet   Cold sore appears to be new. Given recurrence of cold sores and current cold sore, opted to tx with oral Valtrex. Reviewed need to complete all antivirals. Discussed typical course of illness, symptomatic treatment and when to return to clinic. Patient in agreement with plan and all questions were answered.     Patient Instructions      Index Maltese Related topics   Cold Sore (Fever Blister)   ________________________________________________________________________  KEY POINTS    A cold sore is one or more small, painful blisters on or around your lips or just inside your nose or mouth.    Treatment may include antiviral medicine to make the sores heal more quickly or feel less painful.    The blisters are contagious. Wash your hands well after you touch the sores or area around them.  ________________________________________________________________________  What is a cold sore?  A cold sore is a small, painful blister or group of blisters on or around your lips or inside your nose or mouth. Cold sores usually appear when you are sick or stressed. They are also called fever  blisters.  What is the cause?  Cold sores are caused by the herpes simplex virus. Fluid in the blisters contains live virus. The virus can easily spread from one person to another by:    Kissing    Sharing food, drink, or eating utensils    Not washing your hands after touching the sores  Once you are infected, the virus keeps living in your body even after the sores are gone. The virus may cause more cold sores at any time. This is more likely during or after:    Skin injury, such as a scrape or burn    Too much exposure to the sun    Physical illness, such as a cold or flu    Dental treatment    Emotional stress    Tiredness    Hormone changes caused by pregnancy or a woman's menstrual cycle  What are the symptoms?  About 24 hours before you can see blisters, you may feel numbness, tingling, itching, or burning around your lips, nose, or mouth. Then a small cluster of tiny blisters appears on or around your lips or inside your nose or mouth. The blisters may be painful. Over the next few days, the blisters break and fluid drains out. This fluid is very contagious. As the blisters dry, they become sores that are covered with a yellowish dried crust and they are less painful.  How is it diagnosed?  Your healthcare provider will ask about your symptoms and medical history and examine you. Fluid from the blisters may be tested in the lab to check for a virus.  How is it treated?  A nonprescription antiviral medicine called may lessen symptoms. It may also help the sores heal more quickly. Put the medicine on the area with blisters according to the directions on the medicine package. Many other nonprescription medicines can make the sores less painful, but they don't help the sores heal.  Your healthcare provider may prescribe an oral antiviral medicine. The medicine does not get rid of the virus, but it can decrease the number of days you have symptoms and help blisters dry up more quickly. Taking the medicine when  you first start having symptoms may help prevent blisters.  The blisters usually last 7 to 10 days. They should be considered contagious as long as you have any drainage from the blisters and while the sores still have scabs. They may return often (for example, several times a year) or rarely, such as once every few years.  How can I take care of myself?  To help relieve pain:    Take a nonprescription pain medicine.    Put an ice pack on the blisters for just a few minutes at a time.  How can I help prevent cold sores?  If you have cold sores often, your provider may advise you to take antiviral medicine daily to try to help prevent cold sores. Or your provider may prescribe an antiviral medicine for you to take when you know you are going to be exposed to something that causes you to have cold sores, like a lot of sun or stress. Take your medicine as directed by your healthcare provider.  Use a lip balm containing sunscreen whenever your lips will be exposed to the sun. Avoid being out in the sun too much.  To keep from spreading the virus to other parts of your body:    Wash your hands well after you touch the part of your body where you usually get cold sores when the area begins to tingle, itch, burn, or blister.    It is especially important to wash your hands after you put medicine on the sores or when the blisters are draining.    Don t touch your eyes, genitals, or other part of your body after you touch the area around the cold sore, until you have washed your hands.  To keep from spreading the virus to other people:    Avoid kissing or touching others with your mouth when you have a cold sore.    Avoid oral sex when you have a cold sore. The virus could spread to your partner's genitals.    The virus can spread even when you don't have symptoms. It s more likely to cause infection in another person if they have a cut, rash, or sore that lets the virus enter their skin during, for example, kissing. Because  the virus can spread when you don t have symptoms, you may want to use latex or polyurethane condoms during foreplay and every time you have vaginal, oral, or anal sex. Because condoms don t cover all areas of contact, the virus could still spread to your partner. This can be especially concerning if you are starting a new relationship. Discuss any questions you may have about this with your healthcare provider.    Avoid sharing soaps, washcloths, cosmetics (especially lip balm or lipstick), and utensils for eating or drinking.  To keep from getting the cold sore virus from someone else:    If you are caring for someone with the herpes virus, don t touch the sores directly. Use gloves or gauze to apply medicine.    Avoid kissing or touching another person s cold sore with any part of your body.    Avoid sharing soap, towels, cosmetics, food, or drink with someone who has cold sores.  Developed by [employer].  Adult Advisor 2016.2 published by [employer].  Last modified: 2016-02-29  Last reviewed: 2016-02-26  This content is reviewed periodically and is subject to change as new health information becomes available. The information is intended to inform and educate and is not a replacement for medical evaluation, advice, diagnosis or treatment by a healthcare professional.  References   Adult Advisor 2016.2 Index    Copyright   2016 [employer], a division of [employer] All rights reserved.

## 2018-01-03 NOTE — PATIENT INSTRUCTIONS
Patient Information     Patient Name Carin Vigil [id number] Female 1969      Patient Instructions by Yenni Guillaume NP at 2017  3:45 PM     Author:  Yenni Guillaume NP  Service:  (none) Author Type:  PHYS- Nurse Practitioner     Filed:  2017  3:45 PM  Encounter Date:  2017 Status:  Addendum     :  Yenni Guillaume NP (PHYS- Nurse Practitioner)        Related Notes: Original Note by Yenni Guillaume NP (PHYS- Nurse Practitioner) filed at 2017  3:45 PM               Index [place] Related topics   Cold Sore (Fever Blister)   ________________________________________________________________________  KEY POINTS    A cold sore is one or more small, painful blisters on or around your lips or just inside your nose or mouth.    Treatment may include antiviral medicine to make the sores heal more quickly or feel less painful.    The blisters are contagious. Wash your hands well after you touch the sores or area around them.  ________________________________________________________________________  What is a cold sore?  A cold sore is a small, painful blister or group of blisters on or around your lips or inside your nose or mouth. Cold sores usually appear when you are sick or stressed. They are also called fever blisters.  What is the cause?  Cold sores are caused by the herpes simplex virus. Fluid in the blisters contains live virus. The virus can easily spread from one person to another by:    Kissing    Sharing food, drink, or eating utensils    Not washing your hands after touching the sores  Once you are infected, the virus keeps living in your body even after the sores are gone. The virus may cause more cold sores at any time. This is more likely during or after:    Skin injury, such as a scrape or burn    Too much exposure to the sun    Physical illness, such as a cold or flu    Dental treatment    Emotional stress    Tiredness    Hormone changes caused by pregnancy or a  woman's menstrual cycle  What are the symptoms?  About 24 hours before you can see blisters, you may feel numbness, tingling, itching, or burning around your lips, nose, or mouth. Then a small cluster of tiny blisters appears on or around your lips or inside your nose or mouth. The blisters may be painful. Over the next few days, the blisters break and fluid drains out. This fluid is very contagious. As the blisters dry, they become sores that are covered with a yellowish dried crust and they are less painful.  How is it diagnosed?  Your healthcare provider will ask about your symptoms and medical history and examine you. Fluid from the blisters may be tested in the lab to check for a virus.  How is it treated?  A nonprescription antiviral medicine called may lessen symptoms. It may also help the sores heal more quickly. Put the medicine on the area with blisters according to the directions on the medicine package. Many other nonprescription medicines can make the sores less painful, but they don't help the sores heal.  Your healthcare provider may prescribe an oral antiviral medicine. The medicine does not get rid of the virus, but it can decrease the number of days you have symptoms and help blisters dry up more quickly. Taking the medicine when you first start having symptoms may help prevent blisters.  The blisters usually last 7 to 10 days. They should be considered contagious as long as you have any drainage from the blisters and while the sores still have scabs. They may return often (for example, several times a year) or rarely, such as once every few years.  How can I take care of myself?  To help relieve pain:    Take a nonprescription pain medicine.    Put an ice pack on the blisters for just a few minutes at a time.  How can I help prevent cold sores?  If you have cold sores often, your provider may advise you to take antiviral medicine daily to try to help prevent cold sores. Or your provider may  prescribe an antiviral medicine for you to take when you know you are going to be exposed to something that causes you to have cold sores, like a lot of sun or stress. Take your medicine as directed by your healthcare provider.  Use a lip balm containing sunscreen whenever your lips will be exposed to the sun. Avoid being out in the sun too much.  To keep from spreading the virus to other parts of your body:    Wash your hands well after you touch the part of your body where you usually get cold sores when the area begins to tingle, itch, burn, or blister.    It is especially important to wash your hands after you put medicine on the sores or when the blisters are draining.    Don t touch your eyes, genitals, or other part of your body after you touch the area around the cold sore, until you have washed your hands.  To keep from spreading the virus to other people:    Avoid kissing or touching others with your mouth when you have a cold sore.    Avoid oral sex when you have a cold sore. The virus could spread to your partner's genitals.    The virus can spread even when you don't have symptoms. It s more likely to cause infection in another person if they have a cut, rash, or sore that lets the virus enter their skin during, for example, kissing. Because the virus can spread when you don t have symptoms, you may want to use latex or polyurethane condoms during foreplay and every time you have vaginal, oral, or anal sex. Because condoms don t cover all areas of contact, the virus could still spread to your partner. This can be especially concerning if you are starting a new relationship. Discuss any questions you may have about this with your healthcare provider.    Avoid sharing soaps, washcloths, cosmetics (especially lip balm or lipstick), and utensils for eating or drinking.  To keep from getting the cold sore virus from someone else:    If you are caring for someone with the herpes virus, don t touch the sores  directly. Use gloves or gauze to apply medicine.    Avoid kissing or touching another person s cold sore with any part of your body.    Avoid sharing soap, towels, cosmetics, food, or drink with someone who has cold sores.  Developed by [employer].  Adult Advisor 2016.2 published by [employer].  Last modified: 2016-02-29  Last reviewed: 2016-02-26  This content is reviewed periodically and is subject to change as new health information becomes available. The information is intended to inform and educate and is not a replacement for medical evaluation, advice, diagnosis or treatment by a healthcare professional.  References   Adult Advisor 2016.2 Index    Copyright   2016 [employer], a division of [employer] All rights reserved.

## 2018-01-03 NOTE — NURSING NOTE
Patient Information     Patient Name MRN Carin Dewey [id number] Female 1969      Nursing Note by Demi Castellanos at 2017  2:15 PM     Author:  Demi Castellanos Service:  (none) Author Type:  (none)     Filed:  2017  3:40 PM Encounter Date:  2017 Status:  Signed     :  Demi Castellanos            Patient presents today for cold sores on her outer lips, appeared about 5 weeks ago. They wont go away with use of abreva, and patient reports they are sore and swollen.  Demi Castellanos LPN ....................  2017   3:32 PM

## 2018-01-04 NOTE — PROGRESS NOTES
Patient Information     Patient Name MRN Sex Carin Garcia [id number] Female 1969      Progress Notes by Micky Santa DO at 2017  1:45 PM     Author:  Micky Santa DO Service:  (none) Author Type:  Physician     Filed:  2017  2:18 PM Encounter Date:  2017 Status:  Signed     :  Micky Santa DO (Physician)            Carin Shea was seen in consultation for a chief complaint of injury to the right foot.    CHIEF COMPLAINT: Carin Shea is a 47 y.o.  female  Chief Complaint     Patient presents with       Consult      rt calcaneous fx, doi          HISTORY OF PRESENTING INJURY   History of presenting injury, patient fell off of her mother's deck when the railing failed on . She had pain into her foot and came into the ER on . She is evaluated and then placed into a splint what appeared to be a fracture. She comes in today for recheck.  Description of pain:  mild aching   Radiation of pain: no  Pain course: stable  Worse with: bending or flexing affected area and extending affected area  Improved by: rest  History of injection: No  Any PT: No      PAST MEDICAL HISTORY:  Past Medical History:     Diagnosis  Date     Closed nondisplaced fracture of navicular bone of right foot  2017       PAST SURGICAL HISTORY:  Appendectomy.  Cholecystectomy.  2 C-sections.  Hysterectomy.    ORTHOPEDIC FRACTURES AND BROKEN BONES:  No fractures.    ALLERGIES:  Allergies     Allergen  Reactions     Codeine *Unknown     Diphenhydramine Agitation     Morphine Nausea Only     Penicillamine *Unknown     Penicillins *Unknown - Childhood Rxn       CURRENT MEDICATIONS:  Current Outpatient Prescriptions       Medication  Sig Dispense Refill     ACCU-CHEK BETO PLUS METER        ACCU-CHEK FASTCLIX        amLODIPine (NORVASC) 5 mg tablet Take 1 tablet by mouth once daily.  0     clonazePAM (KLONOPIN) 1 mg tablet Take 1 tablet by mouth 3 times daily if needed.   0     cyclobenzaprine (FLEXERIL) 10 mg tablet Take 1 tablet by mouth 3 times daily if needed for Muscle Spasm. Take 1/2 to one tablet PRN  0     ergocalciferol (VITAMIN D2; DRISDOL) 50,000 unit capsule Take 1 capsule by mouth every Monday and Thursday.  0     glatiramer (COPAXONE) 20 mg/mL injection Inject 20 mg subcutaneous once daily.  0     hydroCHLOROthiazide 12.5 mg tablet Take 1 tablet by mouth once daily.  0     HYDROcodone-acetaminophen,  mg, (NORCO )  mg per tablet Take 1-2 tablets by mouth every 6 hours if needed         KLOR-CON M20 20 mEq Extended-Release tablet Take 20 mEq by mouth once daily with a meal.       lisinopril (PRINIVIL; ZESTRIL) 10 mg tablet Take 10 mg by mouth once daily.       metFORMIN (GLUCOPHAGE) 500 mg tablet Take 1,000 mg by mouth 2 times daily with meals.  0     oxyCODONE-acetaminophen, 5-325 mg, (PERCOCET) 5-325 mg per tablet Take 1 tablet by mouth every 4 hours if needed  for Pain Max acetaminophen dose: 4000mg in 24 hrs. 30 tablet 0     valACYclovir (VALTREX) 1 gram tablet Take 2,000 mg by mouth 2 times daily.       zaleplon (SONATA) 10 mg capsule Take 1 Cap by mouth at bedtime.  0     No current facility-administered medications for this visit.      Medications have been reviewed by me and are current to the best of my knowledge and ability.      SOCIAL HISTORY:  Marital Status: single  Children: Yes  Occupation: She works at the [employer].  Alcohol use:  No  Tobacco use: Smoker: no  Are you or have you used illicit drugs:  no    FAMILY HISTORY:  Mother is alive, history of brain cancer, diabetes, heart disease. Father passed away from brain cancer and emphysema.    REVIEW OF SYSTEMS:  The review of systems as documented in the HPI and on the intake questionnaire, completed by the patient on 5/9/2017 have been reviewed by myself and the pertinent positives and negatives addressed.  The remainder of the complete review of systems was  "non-contributory.    PHYSICAL EXAM:   /80  Pulse 92  Ht 1.613 m (5' 3.5\")  Wt 124.7 kg (275 lb)  LMP 04/30/2017  BMI 47.95 kg/m2 Body mass index is 47.95 kg/(m^2).    General Appearance: Pleasant female in good appearance, mood and affect.  Alert and orientated times three ( time, date and location).    Skin: Abnormal, slight swelling over the dorsum of the right foot..      Ankle:  Tenderness at the dorsum of the right foot.  positive swelling on the right foot.  Motion: full    Knee:    Effusion: no  Motion: full    Hip:  Flexion:Normal  Extension: Normal  Internal rotation: Normal  External rotation: Normal    Calf:  negative tenderness    Feet:  Pes planus negative   Sensation  Abnormal, due to her MS.  Capillary refill Normal    Eyes:  Pupils are round.    Ears:  Hearing: Intact    Heart: Good capillary refill into her feet.    Lungs: Clear.     Radiographic images from 5/7 where independently reviewed and discussed with the patient.      Xray:    X-rays demonstrate old well corticated ossific densities posterior to the ankle. There is a avulsion fracture off of the navicular.    EXAM:  XR Right Ankle Complete, 3 or More Views  CLINICAL HISTORY:  47 years old, female; Pain; Ankle; Right; Additional info: Fall  TECHNIQUE:  Frontal, lateral and oblique views of the right ankle.  COMPARISON:  No relevant prior studies available.  FINDINGS:  Bones/joints: Well-corticated ossific densities posterior to the ankle joint  may represent avulsion fractures of unknown age. Degenerative changes in the  medial and lateral malleolus.  Soft tissues: Circumferential soft tissue swelling around the ankle.  IMPRESSION:  1. Circumferential soft tissue swelling around the ankle.  2. Well-corticated ossific densities posterior to the ankle joint may  represent avulsion fractures of unknown age.  3. Degenerative changes in the medial and lateral malleolus.  THIS DOCUMENT HAS BEEN ELECTRONICALLY SIGNED BY NAIMA STEPHEN " MD    CT:     EXAM:  CT Right Lower Extremity Without Intravenous Contrast, Foot  CLINICAL HISTORY:  47 years old, female; Pain; Heel; Right; Additional info: Fall  TECHNIQUE:  Axial computed tomography images of the right foot without intravenous  contrast. This CT exam was performed using one or more of the following dose  reduction techniques: automated exposure control, adjustment of the mA and/or  kV according to patient size, and/or use of iterative reconstruction technique.  Coronal and sagittal reformatted images were created and reviewed.  COMPARISON:  No relevant prior studies available.  FINDINGS:  Bones/joints: Multiple well-corticated bony fragments posterior to the talus  may represent avulsion fractures of unknown age. Avulsion fracture off the  superior aspect of the navicular may be acute or chronic.  Soft tissues: Unremarkable. No radiopaque foreign body.  IMPRESSION:  1. Multiple well-corticated bony fragments posterior to the talus may  represent avulsion fractures of unknown age.  2. Avulsion fracture off the superior aspect of the navicular may be acute or  chronic.  3. No acute fracture of the calcaneus  THIS DOCUMENT HAS BEEN ELECTRONICALLY SIGNED BY NAIMA CARROLL MD  Electronically signed by: Naima Carroll MD on 5/7/2017 11:47 AM    IMPRESSION:    Right navicular fracture of the foot entheses (DOI 5/6/2017).    PLAN:  Risks, benefits, conservative, surgical and alternatives to treatment where discussed and the patient would like to proceed with conservative measures.  She will go on a hard soled postop shoe.  She will rest, ice as needed.  She can return to work on 5/12.  She will follow up in 4 weeks I'll need x-rays first to be foot x-rays.  Questions and concerns answered.    Micky Santa D.O.  Orthopaedic Surgeon    [facility]  [address] [place] [address]  Phone [phone] (KNEE)  Fax [phone]    This document was created using  computer generated templates and voice activated software.    2:11 PM 5/9/2017

## 2018-01-04 NOTE — NURSING NOTE
Patient Information     Patient Name MRN Sex Carin Garcia [id number] Female 1969      Nursing Note by Denise Pierson at 2017  1:45 PM     Author:  Denise Pierson Service:  (none) Author Type:  (none)     Filed:  2017  1:01 PM Encounter Date:  2017 Status:  Signed     :  Denise Pierson            Pt presents for a consult s/p right calcaneous fx, doi     Denise Pierson CMA 2017 1:01 PM

## 2018-01-27 VITALS
WEIGHT: 275 LBS | HEIGHT: 64 IN | SYSTOLIC BLOOD PRESSURE: 148 MMHG | DIASTOLIC BLOOD PRESSURE: 80 MMHG | HEART RATE: 92 BPM | BODY MASS INDEX: 46.95 KG/M2

## 2018-01-27 VITALS
SYSTOLIC BLOOD PRESSURE: 122 MMHG | DIASTOLIC BLOOD PRESSURE: 88 MMHG | BODY MASS INDEX: 47.82 KG/M2 | TEMPERATURE: 98.3 F | WEIGHT: 275 LBS

## 2018-02-02 ENCOUNTER — HOSPITAL ENCOUNTER (EMERGENCY)
Facility: HOSPITAL | Age: 48
Discharge: HOME | End: 2018-02-02
Admitting: INTERNAL MEDICINE
Payer: COMMERCIAL

## 2018-02-02 VITALS
HEIGHT: 63 IN | SYSTOLIC BLOOD PRESSURE: 136 MMHG | OXYGEN SATURATION: 96 % | BODY MASS INDEX: 40.4 KG/M2 | TEMPERATURE: 97.8 F | WEIGHT: 228 LBS | DIASTOLIC BLOOD PRESSURE: 88 MMHG | RESPIRATION RATE: 18 BRPM

## 2018-02-02 DIAGNOSIS — R59.0 CERVICAL LYMPHADENOPATHY: ICD-10-CM

## 2018-02-02 PROCEDURE — 99283 EMERGENCY DEPT VISIT LOW MDM: CPT | Performed by: INTERNAL MEDICINE

## 2018-02-02 PROCEDURE — 25000132 ZZH RX MED GY IP 250 OP 250 PS 637: Performed by: INTERNAL MEDICINE

## 2018-02-02 PROCEDURE — 99283 EMERGENCY DEPT VISIT LOW MDM: CPT

## 2018-02-02 RX ORDER — AZITHROMYCIN 250 MG/1
500 TABLET, FILM COATED ORAL ONCE
Status: COMPLETED | OUTPATIENT
Start: 2018-02-02 | End: 2018-02-02

## 2018-02-02 RX ORDER — AZITHROMYCIN 250 MG/1
TABLET, FILM COATED ORAL
Qty: 6 TABLET | Refills: 0 | Status: SHIPPED | OUTPATIENT
Start: 2018-02-03 | End: 2018-02-07

## 2018-02-02 RX ADMIN — AZITHROMYCIN 500 MG: 250 TABLET, FILM COATED ORAL at 06:39

## 2018-02-02 NOTE — LETTER
February 2, 2018      To Whom It May Concern:      Carin Shea was seen in our Emergency Department today, 02/02/18. She may return to work/school.    Sincerely,        Alban Hurt MD

## 2018-02-02 NOTE — DISCHARGE INSTRUCTIONS
Lymphadenopathy  Lymphadenopathy is swelling of the lymph nodes. Lymph nodes are small, bean-shaped glands around the body.  What are lymph nodes?  Lymph nodes are part of your immune system. These glands are found in your neck, over your clavicle, armpits, groin, chest, and abdomen. They act as filters for lymph fluid as it flows through your body. Lymph fluid contains white blood cells (lymphocytes) that help the body fight infection and disease.   Why lymph nodes swell  Lymphadenopathy is very common. The glands often enlarge during a viral or bacterial infection. It can happen during a cold, the flu, or strep throat. The nodes may swell in just one area of the body, such as the neck (localized). Or nodes may swell all over the body (generalized). The neck (cervical) lymph nodes are the most common site of lymphadenopathy.  What causes lymphadenopathy?  Dead cells and fluid build up in the lymph nodes as they help fight infection or disease. This causes them to swell in size. Enlarged lymph nodes are often near the source of infection. This can help to find the cause of an infection. For example, swollen lymph nodes around the jaw may be because of an infection in the teeth or mouth. But lymphadenopathy may also be generalized. This is common in some viral illnesses such as infectious mononucleosis or chickenpox (varicella).  Lymphadenopathy can also be caused by:    Infection of a lymph node or small group of nodes (lymphadenitis)    Cancer    Reactions to medicines such as antibiotics and certain blood pressure, gout, and seizure medicines    Other health conditions, such as HIV infection, lupus, or sarcoidosis  Symptoms of lymphadenopathy  Lymphadenopathy can cause symptoms such as:    Lumps under the jaw, on the sides or back of the neck, in the armpits, in the groin, or in the chest or belly (abdomen)    Pain or tenderness in any of these areas    Redness or warmth in any of these areas  You may also have  symptoms from an infection causing the swollen glands. These symptoms may include fever, sore throat, body aches, or cough.  Diagnosing lymphadenopathy  Your healthcare provider will ask about your health history and symptoms. He or she will give you a physical exam and check the areas where lymph nodes are enlarged. Your healthcare provider will check the size and location of the nodes, and ask how long they have been swollen and if they are painful. Diagnostic tests and referral to specialists may be recommended. They may include:    Blood tests. These are done to check for signs of infection and other problems.    Urine test. This is also done to check for infection and other problems.    Chest X-ray, ultrasound, CT scan, or MRI scans. These tests can show enlarged lymph nodes or other problems.    Lymph node biopsy. If lymph nodes are swollen for 3 to 4 weeks, they may be checked with a biopsy. Small samples of lymph node tissue are taken and checked in a lab for signs of cancer. You may be referred to a specialist in blood disorders and cancer (hematologist and oncologist).  Treatment for lymphadenopathy  The treatment of enlarged lymph nodes depends on the cause. Enlarged lymph nodes are often harmless and go away without any treatment. Treatment is most often done on the cause of the enlarged nodes and may include:    Antibiotic medicine to treat a bacterial infection    Incision and drainage of a lymph node for lymphadenitis    Other medicines or procedures to treat the cause of the enlarged nodes  You may need follow-up exam in 3 to 4 weeks to recheck enlarged nodes.     When to call your healthcare provider  Call your healthcare provider if you have lymph nodes that are still swollen after 3 to 4 weeks, or as directed by your healthcare provider.   Date Last Reviewed: 5/1/2017 2000-2017 [employer]. [address] [place] [address], [place] [address]. All rights reserved. This information is not  intended as a substitute for professional medical care. Always follow your healthcare professional's instructions.
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
## 2018-02-02 NOTE — ED AVS SNAPSHOT
[facility]    [address] [address]    Phone:  [phone]                                       Carin Shea   MRN: [id number]    Department:  [facility]   Date of Visit:  2/2/2018           After Visit Summary Signature Page     I have received my discharge instructions, and my questions have been answered. I have discussed any challenges I see with this plan with the nurse or doctor.    ..........................................................................................................................................  Patient/Patient Representative Signature      ..........................................................................................................................................  Patient Representative Print Name and Relationship to Patient    ..................................................               ................................................  Date                                            Time    ..........................................................................................................................................  Reviewed by Signature/Title    ...................................................              ..............................................  Date                                                            Time

## 2018-02-02 NOTE — ED NOTES
Discharge instructions gone over with patient and she states understanding. Patient is then discharged in stable condition, ambulatory, per self.

## 2018-02-02 NOTE — ED NOTES
Patient states she is here because she has a swollen lymph node on right side of her neck.  She states it started around the 31st of January and she was seen in [place].  States they told her to use warm compresses and if it worsens to follow. This morning she states it is very tender and swollen.  States the warm compresses are not helping and that Tylenol and Ibuprofen only help for a short period of time.

## 2018-02-03 ASSESSMENT — ENCOUNTER SYMPTOMS
NECK PAIN: 0
FLANK PAIN: 0
BACK PAIN: 0
VOICE CHANGE: 0
NUMBNESS: 0
CHILLS: 0
SHORTNESS OF BREATH: 0
DIZZINESS: 0
ABDOMINAL PAIN: 0
MYALGIAS: 0
WOUND: 0
COLOR CHANGE: 0
FEVER: 0
PALPITATIONS: 0
ABDOMINAL DISTENTION: 0
NAUSEA: 0
COUGH: 0
FACIAL SWELLING: 0
LIGHT-HEADEDNESS: 0
WHEEZING: 0
HEMATURIA: 0
VOMITING: 0
DYSURIA: 0
NECK STIFFNESS: 0
ANAL BLEEDING: 0
BLOOD IN STOOL: 0
ARTHRALGIAS: 0
HEADACHES: 0
CONFUSION: 0
CHEST TIGHTNESS: 0
DIAPHORESIS: 0

## 2018-02-07 ENCOUNTER — HOSPITAL ENCOUNTER (EMERGENCY)
Facility: HOSPITAL | Age: 48
Discharge: HOME | End: 2018-02-07
Attending: INTERNAL MEDICINE | Admitting: INTERNAL MEDICINE
Payer: COMMERCIAL

## 2018-02-07 VITALS
OXYGEN SATURATION: 97 % | DIASTOLIC BLOOD PRESSURE: 100 MMHG | SYSTOLIC BLOOD PRESSURE: 136 MMHG | RESPIRATION RATE: 16 BRPM | TEMPERATURE: 98.7 F | HEART RATE: 90 BPM

## 2018-02-07 DIAGNOSIS — R22.0 LUMP OF SCALP: ICD-10-CM

## 2018-02-07 DIAGNOSIS — R59.0 CERVICAL ADENOPATHY: ICD-10-CM

## 2018-02-07 PROCEDURE — 99283 EMERGENCY DEPT VISIT LOW MDM: CPT

## 2018-02-07 PROCEDURE — 99283 EMERGENCY DEPT VISIT LOW MDM: CPT | Performed by: INTERNAL MEDICINE

## 2018-02-07 RX ORDER — TRAMADOL HYDROCHLORIDE 50 MG/1
50 TABLET ORAL EVERY 8 HOURS PRN
Qty: 9 TABLET | Refills: 0 | Status: SHIPPED | OUTPATIENT
Start: 2018-02-07 | End: 2018-02-10

## 2018-02-07 RX ORDER — TRAMADOL HYDROCHLORIDE 50 MG/1
50 TABLET ORAL EVERY 8 HOURS PRN
Qty: 3 TABLET | Refills: 0 | Status: SHIPPED | OUTPATIENT
Start: 2018-02-07 | End: 2018-03-18

## 2018-02-07 RX ORDER — TRAMADOL HYDROCHLORIDE 50 MG/1
TABLET, FILM COATED ORAL
Status: DISCONTINUED
Start: 2018-02-07 | End: 2018-02-08 | Stop reason: HOSPADM

## 2018-02-07 NOTE — ED AVS SNAPSHOT
[facility]    [address] [address]    Phone:  [phone]                                       Carin Shea   MRN: [id number]    Department:  [facility]   Date of Visit:  2/7/2018           Patient Information     Date Of Birth          7/17/1969        Your diagnoses for this visit were:     Cervical adenopathy     Lump of scalp        You were seen by Alban Hurt MD.      Follow-up Information     Schedule an appointment as soon as possible for a visit with Jerry Resendiz MD.    Specialty:  General Surgery    Contact information:    [address] [place] [address]  [address] [facility] [address]  [phone]          Discharge Instructions         Lymphadenopathy  Lymphadenopathy is swelling of the lymph nodes. Lymph nodes are small, bean-shaped glands around the body.  What are lymph nodes?  Lymph nodes are part of your immune system. These glands are found in your neck, over your clavicle, armpits, groin, chest, and abdomen. They act as filters for lymph fluid as it flows through your body. Lymph fluid contains white blood cells (lymphocytes) that help the body fight infection and disease.   Why lymph nodes swell  Lymphadenopathy is very common. The glands often enlarge during a viral or bacterial infection. It can happen during a cold, the flu, or strep throat. The nodes may swell in just one area of the body, such as the neck (localized). Or nodes may swell all over the body (generalized). The neck (cervical) lymph nodes are the most common site of lymphadenopathy.  What causes lymphadenopathy?  Dead cells and fluid build up in the lymph nodes as they help fight infection or disease. This causes them to swell in size. Enlarged lymph nodes are often near the source of infection. This can help to find the cause of an infection. For example, swollen lymph nodes around the jaw may be because of an infection in the teeth or mouth. But lymphadenopathy may  also be generalized. This is common in some viral illnesses such as infectious mononucleosis or chickenpox (varicella).  Lymphadenopathy can also be caused by:    Infection of a lymph node or small group of nodes (lymphadenitis)    Cancer    Reactions to medicines such as antibiotics and certain blood pressure, gout, and seizure medicines    Other health conditions, such as HIV infection, lupus, or sarcoidosis  Symptoms of lymphadenopathy  Lymphadenopathy can cause symptoms such as:    Lumps under the jaw, on the sides or back of the neck, in the armpits, in the groin, or in the chest or belly (abdomen)    Pain or tenderness in any of these areas    Redness or warmth in any of these areas  You may also have symptoms from an infection causing the swollen glands. These symptoms may include fever, sore throat, body aches, or cough.  Diagnosing lymphadenopathy  Your healthcare provider will ask about your health history and symptoms. He or she will give you a physical exam and check the areas where lymph nodes are enlarged. Your healthcare provider will check the size and location of the nodes, and ask how long they have been swollen and if they are painful. Diagnostic tests and referral to specialists may be recommended. They may include:    Blood tests. These are done to check for signs of infection and other problems.    Urine test. This is also done to check for infection and other problems.    Chest X-ray, ultrasound, CT scan, or MRI scans. These tests can show enlarged lymph nodes or other problems.    Lymph node biopsy. If lymph nodes are swollen for 3 to 4 weeks, they may be checked with a biopsy. Small samples of lymph node tissue are taken and checked in a lab for signs of cancer. You may be referred to a specialist in blood disorders and cancer (hematologist and oncologist).  Treatment for lymphadenopathy  The treatment of enlarged lymph nodes depends on the cause. Enlarged lymph nodes are often harmless and  go away without any treatment. Treatment is most often done on the cause of the enlarged nodes and may include:    Antibiotic medicine to treat a bacterial infection    Incision and drainage of a lymph node for lymphadenitis    Other medicines or procedures to treat the cause of the enlarged nodes  You may need follow-up exam in 3 to 4 weeks to recheck enlarged nodes.     When to call your healthcare provider  Call your healthcare provider if you have lymph nodes that are still swollen after 3 to 4 weeks, or as directed by your healthcare provider.   Date Last Reviewed: 5/1/2017 2000-2017 [employer]. [address] [place] [address]. All rights reserved. This information is not intended as a substitute for professional medical care. Always follow your healthcare professional's instructions.          ED Discharge Orders     GENERAL SURG ADULT REFERRAL       Your provider has referred you to:     Please be aware that coverage of these services is subject to the terms and limitations of your health insurance plan.  Call member services at your health plan with any benefit or coverage questions.      Please bring the following with you to your appointment:    (1) Any X-Rays, CTs or MRIs which have been performed.  Contact the facility where they were done to arrange for  prior to your scheduled appointment.   (2) List of current medications   (3) This referral request   (4) Any documents/labs given to you for this referral                     Review of your medicines      START taking        Dose / Directions Last dose taken    * traMADol 50 MG tablet   Commonly known as:  ULTRAM   Dose:  50 mg   Quantity:  3 tablet        Take 1 tablet (50 mg) by mouth every 8 hours as needed for moderate pain   Refills:  0        * traMADol 50 MG tablet   Commonly known as:  ULTRAM   Dose:  50 mg   Quantity:  9 tablet        Take 1 tablet (50 mg) by mouth every 8 hours as needed for moderate pain    Refills:  0        * Notice:  This list has 2 medication(s) that are the same as other medications prescribed for you. Read the directions carefully, and ask your doctor or other care provider to review them with you.      Our records show that you are taking the medicines listed below. If these are incorrect, please call your family doctor or clinic.        Dose / Directions Last dose taken    atorvastatin 10 MG tablet   Commonly known as:  LIPITOR   Dose:  10 mg   Quantity:  30 tablet        Take 1 tablet (10 mg) by mouth daily   Refills:  1        blood glucose lancing device   Quantity:  1 each        Check blood glucose bid   Refills:  0        blood glucose monitoring test strip   Commonly known as:  no brand specified   Quantity:  180 strip        Check blood glucose bid   Refills:  3        cholecalciferol 5000 UNITS Caps capsule   Commonly known as:  vitamin D3   Dose:  5000 Units   Quantity:  30 capsule        Take 1 capsule (5,000 Units) by mouth daily   Refills:  5        CLONAZEPAM PO   Dose:  1 mg        Take 1 mg by mouth At Bedtime   Refills:  0        COPAXONE 20 MG/ML injection   Dose:  20 mg   Generic drug:  glatiramer        Inject 20 mg Subcutaneous daily   Refills:  0        potassium chloride SA 20 MEQ CR tablet   Commonly known as:  KLOR-CON   Dose:  20 mEq   Quantity:  30 tablet        Take 1 tablet (20 mEq) by mouth daily   Refills:  0        sitagliptin-metFORMIN  MG per tablet   Commonly known as:  JANUMET   Dose:  1 tablet        Take 1 tablet by mouth once   Refills:  0                Prescriptions were sent or printed at these locations (2 Prescriptions)                   Other Prescriptions                Printed at Department/Unit printer (2 of 2)         traMADol (ULTRAM) 50 MG tablet               traMADol (ULTRAM) 50 MG tablet                Orders Needing Specimen Collection     None      Pending Results     No orders found from 2/5/2018 to 2/8/2018.            Pending  "Culture Results     No orders found from 2018 to 2018.            Thank you for choosing [facility]       Thank you for choosing [facility] for your care. Our goal is always to provide you with excellent care. Hearing back from our patients is one way we can continue to improve our services. Please take a few minutes to complete the written survey that you may receive in the mail after you visit with us. Thank you!        [email or web address] Information     [employer] lets you send messages to your doctor, view your test results, renew your prescriptions, schedule appointments and more. To sign up, go to [email or web address] . Click on \"Log in\" on the left side of the screen, which will take you to the Welcome page. Then click on \"Sign up Now\" on the right side of the page.     You will be asked to enter the access code listed below, as well as some personal information. Please follow the directions to create your username and password.     Your access code is: [email or web address]  Expires: 5/3/2018  6:42 AM     Your access code will  in 90 days. If you need help or a new code, please call your [facility] clinic or [phone].        Care EveryWhere ID     This is your Care EveryWhere ID. This could be used by other organizations to access your [facility] medical records  [id number]        Equal Access to Services     ZACRAIAS SOSA : Hadii [place], waaxda luqadaha, qaybta kaalmada adeegashok, jaren borja . So [facility] [phone].    ATENCIÓN: Si habla español, tiene a quinn disposición servicios gratuitos de asistencia lingüística. Llame al [phone].    We comply with applicable federal civil rights laws and [place] laws. We do not discriminate on the basis of race, color, national origin, age, disability, sex, sexual orientation, or gender identity.            After Visit Summary       This is your record. Keep this with you and show to your community pharmacist(s) and doctor(s) " at your next visit.

## 2018-02-07 NOTE — ED AVS SNAPSHOT
[facility]    [address] [address]    Phone:  [phone]                                       Carin Shea   MRN: [id number]    Department[facility]  [facility]   Date of Visit:  2/7/2018           After Visit Summary Signature Page     I have received my discharge instructions, and my questions have been answered. I have discussed any challenges I see with this plan with the nurse or doctor.    ..........................................................................................................................................  Patient/Patient Representative Signature      ..........................................................................................................................................  Patient Representative Print Name and Relationship to Patient    ..................................................               ................................................  Date                                            Time    ..........................................................................................................................................  Reviewed by Signature/Title    ...................................................              ..............................................  Date                                                            Time

## 2018-02-08 ENCOUNTER — OFFICE VISIT (OUTPATIENT)
Dept: SURGERY | Facility: OTHER | Age: 48
End: 2018-02-08
Attending: INTERNAL MEDICINE
Payer: COMMERCIAL

## 2018-02-08 VITALS
BODY MASS INDEX: 40.4 KG/M2 | OXYGEN SATURATION: 98 % | TEMPERATURE: 98.7 F | DIASTOLIC BLOOD PRESSURE: 88 MMHG | WEIGHT: 228 LBS | HEIGHT: 63 IN | SYSTOLIC BLOOD PRESSURE: 120 MMHG | HEART RATE: 90 BPM

## 2018-02-08 DIAGNOSIS — R59.0 CERVICAL ADENOPATHY: ICD-10-CM

## 2018-02-08 DIAGNOSIS — R22.0 LUMP OF SCALP: ICD-10-CM

## 2018-02-08 LAB
ALBUMIN SERPL-MCNC: 3.8 G/DL (ref 3.4–5)
ALP SERPL-CCNC: 66 U/L (ref 40–150)
ALT SERPL W P-5'-P-CCNC: 79 U/L (ref 0–50)
ANION GAP SERPL CALCULATED.3IONS-SCNC: 7 MMOL/L (ref 3–14)
AST SERPL W P-5'-P-CCNC: 44 U/L (ref 0–45)
BASOPHILS # BLD AUTO: 0.1 10E9/L (ref 0–0.2)
BASOPHILS NFR BLD AUTO: 1 %
BILIRUB SERPL-MCNC: 0.4 MG/DL (ref 0.2–1.3)
BUN SERPL-MCNC: 11 MG/DL (ref 7–30)
CALCIUM SERPL-MCNC: 8.7 MG/DL (ref 8.5–10.1)
CHLORIDE SERPL-SCNC: 105 MMOL/L (ref 94–109)
CO2 SERPL-SCNC: 27 MMOL/L (ref 20–32)
CREAT SERPL-MCNC: 0.56 MG/DL (ref 0.52–1.04)
CRP SERPL-MCNC: 12.9 MG/L (ref 0–8)
DIFFERENTIAL METHOD BLD: NORMAL
EOSINOPHIL # BLD AUTO: 0.3 10E9/L (ref 0–0.7)
EOSINOPHIL NFR BLD AUTO: 2.9 %
ERYTHROCYTE [DISTWIDTH] IN BLOOD BY AUTOMATED COUNT: 12.4 % (ref 10–15)
ERYTHROCYTE [SEDIMENTATION RATE] IN BLOOD BY WESTERGREN METHOD: 16 MM/H (ref 0–20)
GFR SERPL CREATININE-BSD FRML MDRD: >90 ML/MIN/1.7M2
GLUCOSE SERPL-MCNC: 170 MG/DL (ref 70–99)
HCT VFR BLD AUTO: 41.6 % (ref 35–47)
HGB BLD-MCNC: 14.5 G/DL (ref 11.7–15.7)
IMM GRANULOCYTES # BLD: 0 10E9/L (ref 0–0.4)
IMM GRANULOCYTES NFR BLD: 0.3 %
LYMPHOCYTES # BLD AUTO: 2.4 10E9/L (ref 0.8–5.3)
LYMPHOCYTES NFR BLD AUTO: 27.5 %
MCH RBC QN AUTO: 30.6 PG (ref 26.5–33)
MCHC RBC AUTO-ENTMCNC: 34.9 G/DL (ref 31.5–36.5)
MCV RBC AUTO: 88 FL (ref 78–100)
MONOCYTES # BLD AUTO: 0.7 10E9/L (ref 0–1.3)
MONOCYTES NFR BLD AUTO: 7.5 %
NEUTROPHILS # BLD AUTO: 5.3 10E9/L (ref 1.6–8.3)
NEUTROPHILS NFR BLD AUTO: 60.8 %
NRBC # BLD AUTO: 0 10*3/UL
NRBC BLD AUTO-RTO: 0 /100
PLATELET # BLD AUTO: 312 10E9/L (ref 150–450)
POTASSIUM SERPL-SCNC: 4 MMOL/L (ref 3.4–5.3)
PROT SERPL-MCNC: 8.2 G/DL (ref 6.8–8.8)
RBC # BLD AUTO: 4.74 10E12/L (ref 3.8–5.2)
SODIUM SERPL-SCNC: 139 MMOL/L (ref 133–144)
WBC # BLD AUTO: 8.7 10E9/L (ref 4–11)

## 2018-02-08 PROCEDURE — 99203 OFFICE O/P NEW LOW 30 MIN: CPT | Performed by: SURGERY

## 2018-02-08 PROCEDURE — 36415 COLL VENOUS BLD VENIPUNCTURE: CPT | Performed by: SURGERY

## 2018-02-08 PROCEDURE — 85652 RBC SED RATE AUTOMATED: CPT | Performed by: SURGERY

## 2018-02-08 PROCEDURE — 80053 COMPREHEN METABOLIC PANEL: CPT | Performed by: SURGERY

## 2018-02-08 PROCEDURE — 86140 C-REACTIVE PROTEIN: CPT | Performed by: SURGERY

## 2018-02-08 PROCEDURE — 85025 COMPLETE CBC W/AUTO DIFF WBC: CPT | Performed by: SURGERY

## 2018-02-08 RX ORDER — CYCLOBENZAPRINE HCL 5 MG
5 TABLET ORAL 3 TIMES DAILY PRN
Qty: 30 TABLET | Refills: 0 | Status: SHIPPED | OUTPATIENT
Start: 2018-02-08

## 2018-02-08 ASSESSMENT — PAIN SCALES - GENERAL: PAINLEVEL: EXTREME PAIN (8)

## 2018-02-08 NOTE — MR AVS SNAPSHOT
"              After Visit Summary   2/8/2018    Carin Shea    MRN: [id number]           Patient Information     Date Of Birth          7/17/1969        Visit Information        Provider Department      2/8/2018 11:30 AM [facility]        Today's Diagnoses     Cervical adenopathy        Lump of scalp          Care Instructions    Thank you for allowing Dr. Resendiz and our surgical team to participate in your care. Please call with any scheduling questions or concerns to Pepper at [phone] or for nursing questions Abby [phone]            Follow-ups after your visit        Who to contact     If you have questions or need follow up information about today's clinic visit or your schedule please contact [facility] directly at [phone].  Normal or non-critical lab and imaging results will be communicated to you by MyChart, letter or phone within 4 business days after the clinic has received the results. If you do not hear from us within 7 days, please contact the clinic through MyChart or phone. If you have a critical or abnormal lab result, we will notify you by phone as soon as possible.  Submit refill requests through [employer] or call your pharmacy and they will forward the refill request to us. Please allow 3 business days for your refill to be completed.          Additional Information About Your Visit        MyChart Information     [employer] lets you send messages to your doctor, view your test results, renew your prescriptions, schedule appointments and more. To sign up, go to [email or web address] . Click on \"Log in\" on the left side of the screen, which will take you to the Welcome page. Then click on \"Sign up Now\" on the right side of the page.     You will be asked to enter the access code listed below, as well as some personal information. Please follow the directions to create your username and password.     Your access code is: " "[email or web address]  Expires: 5/3/2018  6:42 AM     Your access code will  in 90 days. If you need help or a new code, please call your [facility] or [phone].        Care EveryWhere ID     This is your Care EveryWhere ID. This could be used by other organizations to access your [place] medical records  [id number]        Your Vitals Were     Pulse Temperature Height Last Period Pulse Oximetry BMI (Body Mass Index)    90 98.7  F (37.1  C) (Tympanic) 5' 3\" (1.6 m) 2017 98% 40.39 kg/m2       Blood Pressure from Last 3 Encounters:   18 120/88   18 136/100   18 136/88    Weight from Last 3 Encounters:   18 228 lb (103.4 kg)   18 228 lb (103.4 kg)   17 264 lb (119.7 kg)              We Performed the Following     CBC with platelets and differential     Comprehensive metabolic panel (BMP + Alb, Alk Phos, ALT, AST, Total. Bili, TP)     CRP, inflammation     ESR: Erythrocyte sedimentation rate          Today's Medication Changes          These changes are accurate as of 18 12:15 PM.  If you have any questions, ask your nurse or doctor.               Start taking these medicines.        Dose/Directions    cyclobenzaprine 5 MG tablet   Commonly known as:  FLEXERIL   Used for:  Cervical adenopathy   Started by:  Jerry Resendiz MD        Dose:  5 mg   Take 1 tablet (5 mg) by mouth 3 times daily as needed for muscle spasms   Quantity:  30 tablet   Refills:  0            Where to get your medicines      These medications were sent to [facility] - [address] [address]     Phone:  [phone]     cyclobenzaprine 5 MG tablet                Primary Care Provider Office Phone # Fax #    APOLINAR Sawyer [phone]       [facility] [address] [place] [place] [address]        Equal Access to Services     TERE SOSA AH: Beatrice peguero Sosouleymane, waaxda luqadaha, qaybta kaalmada " jaren farrisclaudio arturoramin walkeraan ah. So [facility] [phone].    ATENCIÓN: Si habla deepa, tiene a quinn disposición servicios gratuitos de asistencia lingüística. Kip al [phone].    We comply with applicable federal civil rights laws and [place] laws. We do not discriminate on the basis of race, color, national origin, age, disability, sex, sexual orientation, or gender identity.            Thank you!     Thank you for [facility]  [facility] your [facility]. Our goal is always to provide you with excellent care. Hearing back from our patients is one way we can continue to improve our services. Please take a few minutes to complete the written survey that you may receive in the mail after your visit with us. Thank you!             Your Updated Medication List - Protect others around you: Learn how to safely use, store and throw away your medicines at www.disposemymeds.org.          This list is accurate as of 2/8/18 12:16 PM.  Always use your most recent med list.                   Brand Name Dispense Instructions for use Diagnosis    atorvastatin 10 MG tablet    LIPITOR    30 tablet    Take 1 tablet (10 mg) by mouth daily    Mixed hyperlipidemia       blood glucose lancing device     1 each    Check blood glucose bid    Type 2 diabetes mellitus with hyperglycemia, without long-term current use of insulin (H)       blood glucose monitoring test strip    no brand specified    180 strip    Check blood glucose bid    Type 2 diabetes mellitus with hyperglycemia, without long-term current use of insulin (H)       cholecalciferol 5000 UNITS Caps capsule    vitamin D3    30 capsule    Take 1 capsule (5,000 Units) by mouth daily    Type 2 diabetes mellitus with hyperglycemia, without long-term current use of insulin (H), Chronic pain syndrome, Multiple sclerosis (H)       CLONAZEPAM PO      Take 1 mg by mouth At Bedtime        COPAXONE 20 MG/ML injection   Generic drug:  glatiramer       Inject 20 mg Subcutaneous daily        cyclobenzaprine 5 MG tablet    FLEXERIL    30 tablet    Take 1 tablet (5 mg) by mouth 3 times daily as needed for muscle spasms    Cervical adenopathy       potassium chloride SA 20 MEQ CR tablet    KLOR-CON    30 tablet    Take 1 tablet (20 mEq) by mouth daily    Benign essential hypertension       sitagliptin-metFORMIN  MG per tablet    JANUMET     Take 1 tablet by mouth once        * traMADol 50 MG tablet    ULTRAM    3 tablet    Take 1 tablet (50 mg) by mouth every 8 hours as needed for moderate pain        * traMADol 50 MG tablet    ULTRAM    9 tablet    Take 1 tablet (50 mg) by mouth every 8 hours as needed for moderate pain        * Notice:  This list has 2 medication(s) that are the same as other medications prescribed for you. Read the directions carefully, and ask your doctor or other care provider to review them with you.

## 2018-02-08 NOTE — ED NOTES
Starting last night patient started to experience back on neck and shoulder up to right side of head, reports of tingling. Patient reports she was seen and had swollen lymph nodes, now feels that up the back of the head she feels she has swollen lymph nodes.  Has been taking tylenol and ibuprofen for the pain and reports its not helping.  Patient reports swelling is worse tonight then last night and that's why she came in now. Patient came from work.
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
## 2018-02-08 NOTE — DISCHARGE INSTRUCTIONS
Lymphadenopathy  Lymphadenopathy is swelling of the lymph nodes. Lymph nodes are small, bean-shaped glands around the body.  What are lymph nodes?  Lymph nodes are part of your immune system. These glands are found in your neck, over your clavicle, armpits, groin, chest, and abdomen. They act as filters for lymph fluid as it flows through your body. Lymph fluid contains white blood cells (lymphocytes) that help the body fight infection and disease.   Why lymph nodes swell  Lymphadenopathy is very common. The glands often enlarge during a viral or bacterial infection. It can happen during a cold, the flu, or strep throat. The nodes may swell in just one area of the body, such as the neck (localized). Or nodes may swell all over the body (generalized). The neck (cervical) lymph nodes are the most common site of lymphadenopathy.  What causes lymphadenopathy?  Dead cells and fluid build up in the lymph nodes as they help fight infection or disease. This causes them to swell in size. Enlarged lymph nodes are often near the source of infection. This can help to find the cause of an infection. For example, swollen lymph nodes around the jaw may be because of an infection in the teeth or mouth. But lymphadenopathy may also be generalized. This is common in some viral illnesses such as infectious mononucleosis or chickenpox (varicella).  Lymphadenopathy can also be caused by:    Infection of a lymph node or small group of nodes (lymphadenitis)    Cancer    Reactions to medicines such as antibiotics and certain blood pressure, gout, and seizure medicines    Other health conditions, such as HIV infection, lupus, or sarcoidosis  Symptoms of lymphadenopathy  Lymphadenopathy can cause symptoms such as:    Lumps under the jaw, on the sides or back of the neck, in the armpits, in the groin, or in the chest or belly (abdomen)    Pain or tenderness in any of these areas    Redness or warmth in any of these areas  You may also have  symptoms from an infection causing the swollen glands. These symptoms may include fever, sore throat, body aches, or cough.  Diagnosing lymphadenopathy  Your healthcare provider will ask about your health history and symptoms. He or she will give you a physical exam and check the areas where lymph nodes are enlarged. Your healthcare provider will check the size and location of the nodes, and ask how long they have been swollen and if they are painful. Diagnostic tests and referral to specialists may be recommended. They may include:    Blood tests. These are done to check for signs of infection and other problems.    Urine test. This is also done to check for infection and other problems.    Chest X-ray, ultrasound, CT scan, or MRI scans. These tests can show enlarged lymph nodes or other problems.    Lymph node biopsy. If lymph nodes are swollen for 3 to 4 weeks, they may be checked with a biopsy. Small samples of lymph node tissue are taken and checked in a lab for signs of cancer. You may be referred to a specialist in blood disorders and cancer (hematologist and oncologist).  Treatment for lymphadenopathy  The treatment of enlarged lymph nodes depends on the cause. Enlarged lymph nodes are often harmless and go away without any treatment. Treatment is most often done on the cause of the enlarged nodes and may include:    Antibiotic medicine to treat a bacterial infection    Incision and drainage of a lymph node for lymphadenitis    Other medicines or procedures to treat the cause of the enlarged nodes  You may need follow-up exam in 3 to 4 weeks to recheck enlarged nodes.     When to call your healthcare provider  Call your healthcare provider if you have lymph nodes that are still swollen after 3 to 4 weeks, or as directed by your healthcare provider.   Date Last Reviewed: 5/1/2017 2000-2017 [employer] [address] [place] [address], [place] [address]. All rights reserved. This information is not  intended as a substitute for professional medical care. Always follow your healthcare professional's instructions.

## 2018-02-08 NOTE — PATIENT INSTRUCTIONS
Thank you for allowing Dr. Resendiz and our surgical team to participate in your care. Please call with any scheduling questions or concerns to Pepper at [phone] or for nursing questions Abby [phone]

## 2018-02-08 NOTE — PROGRESS NOTES
Visit Date:   02/08/2018      Dr. Hurt in the Emergency Room asked me to see Ms. Shea, who is a pleasant 48-year-old female, regarding left-sided head and neck pain and possible need for a lymph node biopsy.  This woman 2-3 weeks ago had noticed a small lump sitting behind her right ear.  It was minimally tender.  She did end up developing a bit of soreness going down behind her right jaw in association with this.  This, though, has generally settled down.  Her problem now is on the left side.  For the last 2 weeks she has had a burning discomfort along the left side of her neck.  This has been getting worse.  She notes soreness starting in the area of the left clavicle and extending up along the left neck towards the occipital region.  She particularly notes the left occipital region is quite tender.  She has difficulty leaning against the area.  She thinks she can feel a lump in the area of the clavicle and in the area of the occipital region as well as a third one in the mid back region.  In association with this, she feels as if the scalp is numb on the left side.  She also notes a headache in association with this and the feeling of a bit of pressure behind her left eye.  She is able to move her neck well.  In association with this, she has not had any fever.  She denies any GI upset.  She has been eating normally.  Bowels have been functioning normally.  She has also noted a bit of discomfort along the left chest towards her axilla.      She was originally seen in the Emergency Room or Urgent Care down in [place].  No particular recommendations were made at that time.  She was seen in the Emergency Room here a week ago and started on antibiotics.  She has not noticed any change in things.  She was again seen in the Emergency Room last evening and she was referred to me for possible lymph node biopsy.  No imaging has been done.  No blood work has been done.      Of note, she did recently find out that she  is approximately 3 months pregnant.  This did come as a surprise to her.  She was not trying to have children.  She does also have MS.  This was diagnosed in  when she presented with some polyneuropathy and problems with her right eye and was diagnosed with an optic neuritis.  Generally, her MS has been well controlled with medication.  The patient is also a diabetic, tends to be moderately well controlled.      CURRENT MEDICATIONS:  Include Lipitor, vitamin D3, clonazepam, Copaxone, potassium chloride, Janumet and Ultram.      ALLERGIES:  SHE HAS ALLERGIES TO CODEINE, DIPHENHYDRAMINE, MORPHINE AND PENICILLINS.      She has had a previous  section.  Has not had any other surgeries.      PHYSICAL EXAMINATION:  On examination today, she is awake and alert, is moving quite well, is moving her neck well.  On examination of the head and neck region, she does have a small, what is likely a lymph node sitting right behind her right ear which is mobile and very minimally tender.  It is smaller than a pea.  I could not feel any other lumps on the right side and she was nontender.  On the left side, she is tender along the edge of the clavicle medially, tender along the muscles all along the sternocleidomastoid extending up to the occipital region.  She is quite tender over the ridge of the occipital bone.  In comparison of the right and left side, left side is no more prominent than the right side, though she is tender on the left side.  In the mid portion of the neck, she does have a small subcutaneous lump, perhaps 1.5 cm in diameter which is minimally tender.  It may be a lipoma, could be a lymph node, but felt quite benign feeling.  It is a bit indistinct.  She is moving her neck well.  Does not have a torticollis.  She did have some tenderness along the lateral edge of the pectoralis major muscle, going into the axilla.      I could not feel lymph nodes in either axilla.  Her abdominal examination was  unremarkable.  She is somewhat obese.  There were no masses.  There is no hepatosplenomegaly.  There are no groin hernias, and I could not feel any lumps in the groins.      ASSESSMENT AND PLAN:  I explained to Ms. Ortiz that I did not feel that the small lumps that she could feel were contributing to her pain.  Indeed, I am wondering more if this is not a flare-up of her multiple sclerosis causing the pain and numbness that she is feeling.  Commonly a pregnancy will aggravate multiple sclerosis.  I really explained to her that from a general surgery point of view,  I really did not have anything further to offer to her other than some reassurance.  I did suggest that she needs to follow up with her primary care physician and also likely contact her neurologist who is managing her MS.      I did order blood work at her request.  Basically, this was all normal except for an elevated CRP at 12.9.  Her CRP in September was 8.3.  Blood work was otherwise normal with normal electrolytes.  Her blood sugar was slightly elevated at 170.  Hemoglobin was 14.5 with a white count of 8.7.      She is thus going to follow up as mentioned with family practice and her neurologist.      Thank you for asking me to see Ms. Ortiz.         CHRISTIN VALENCIA MD             D: 2018   T: 2018   MT: MARLI      Name:     NELSY ORTIZ   MRN:      [id number]        Account:      [id number]   :      1969           Visit Date:   2018      Document: [id number]

## 2018-02-08 NOTE — NURSING NOTE
"Chief Complaint   Patient presents with     Consult     cervical adenopathy/ lump of scalp/ enlarged lymph nodes       Initial /88  Pulse 90  Temp 98.7  F (37.1  C) (Tympanic)  Ht 5' 3\" (1.6 m)  Wt 228 lb (103.4 kg)  LMP 11/19/2017  SpO2 98%  BMI 40.39 kg/m2 Estimated body mass index is 40.39 kg/(m^2) as calculated from the following:    Height as of this encounter: 5' 3\" (1.6 m).    Weight as of this encounter: 228 lb (103.4 kg).  Medication Reconciliation: complete   Mamta Burnett      "

## 2018-02-09 ASSESSMENT — ENCOUNTER SYMPTOMS
NAUSEA: 0
HEADACHES: 1
WOUND: 0
DIZZINESS: 0
BLOOD IN STOOL: 0
DIAPHORESIS: 1
ARTHRALGIAS: 0
CHEST TIGHTNESS: 0
WHEEZING: 0
NUMBNESS: 0
ADENOPATHY: 1
VOMITING: 0
CONFUSION: 0
DYSURIA: 0
FLANK PAIN: 0
SHORTNESS OF BREATH: 0
BACK PAIN: 0
VOICE CHANGE: 0
CHILLS: 0
ABDOMINAL PAIN: 0
COLOR CHANGE: 0
HEMATURIA: 0
MYALGIAS: 0
PALPITATIONS: 0
FEVER: 0
COUGH: 0
NECK STIFFNESS: 0
ANAL BLEEDING: 0
LIGHT-HEADEDNESS: 0
NECK PAIN: 0
ABDOMINAL DISTENTION: 0

## 2018-02-12 DIAGNOSIS — E78.2 MIXED HYPERLIPIDEMIA: ICD-10-CM

## 2018-02-13 RX ORDER — ATORVASTATIN CALCIUM 10 MG/1
TABLET, FILM COATED ORAL
Qty: 30 TABLET | Refills: 2 | Status: SHIPPED | OUTPATIENT
Start: 2018-02-13

## 2018-02-15 DIAGNOSIS — R59.0 CERVICAL ADENOPATHY: ICD-10-CM

## 2018-02-15 RX ORDER — CYCLOBENZAPRINE HCL 5 MG
5 TABLET ORAL 3 TIMES DAILY PRN
Qty: 30 TABLET | Refills: 0 | OUTPATIENT
Start: 2018-02-15

## 2018-02-15 NOTE — TELEPHONE ENCOUNTER
Controlled Substance Refill Request for Flexeril  Problem List Complete:  Yes    Last Written Prescription Date:  2.8.18  Last Fill Quantity: 30,   # refills: 0    Last Office Visit with [facility] primary care provider: 11.30.17    Clinic visit frequency required: Q 1 month     Future Office visit:     Controlled substance agreement on file: Yes:  Date 5.26.17.     Processing:  Fax Rx to [facility] pharmacy   checked in past 6 months?  Yes 5.26.17

## 2018-03-09 ENCOUNTER — DOCUMENTATION ONLY (OUTPATIENT)
Dept: FAMILY MEDICINE | Facility: OTHER | Age: 48
End: 2018-03-09

## 2018-03-18 ENCOUNTER — APPOINTMENT (OUTPATIENT)
Dept: GENERAL RADIOLOGY | Facility: HOSPITAL | Age: 48
End: 2018-03-18
Attending: NURSE PRACTITIONER

## 2018-03-18 ENCOUNTER — HOSPITAL ENCOUNTER (EMERGENCY)
Facility: HOSPITAL | Age: 48
Discharge: HOME | End: 2018-03-18
Attending: NURSE PRACTITIONER | Admitting: NURSE PRACTITIONER

## 2018-03-18 VITALS
SYSTOLIC BLOOD PRESSURE: 118 MMHG | DIASTOLIC BLOOD PRESSURE: 74 MMHG | OXYGEN SATURATION: 95 % | RESPIRATION RATE: 18 BRPM | HEART RATE: 89 BPM | TEMPERATURE: 97.1 F

## 2018-03-18 DIAGNOSIS — S69.92XA THUMB INJURY, LEFT, INITIAL ENCOUNTER: ICD-10-CM

## 2018-03-18 PROCEDURE — 99213 OFFICE O/P EST LOW 20 MIN: CPT | Performed by: NURSE PRACTITIONER

## 2018-03-18 PROCEDURE — 73140 X-RAY EXAM OF FINGER(S): CPT | Mod: TC,LT

## 2018-03-18 PROCEDURE — G0463 HOSPITAL OUTPT CLINIC VISIT: HCPCS

## 2018-03-18 ASSESSMENT — ENCOUNTER SYMPTOMS: JOINT SWELLING: 0

## 2018-03-18 NOTE — ED AVS SNAPSHOT
[facility]    [address] [address]    Phone:  [phone]                                       Carin Shea   MRN: [id number]    Department:  [facility]   Date of Visit:  3/18/2018           Patient Information     Date Of Birth          7/17/1969        Your diagnoses for this visit were:     Thumb injury, left, initial encounter        You were seen by Amrida Riley NP.      Follow-up Information     Follow up with Angela Riley    Specialty:  Family Practice    Why:  As needed    Contact information:    [facility]  [address] [place] [address]  [phone]          Discharge Instructions       Monitor symptoms.   Take ibuprofen for pain if needed.   Follow up with PCP if symptoms worsen or concerns develop.        Review of your medicines      Our records show that you are taking the medicines listed below. If these are incorrect, please call your family doctor or clinic.        Dose / Directions Last dose taken    atorvastatin 10 MG tablet   Commonly known as:  LIPITOR   Quantity:  30 tablet        TAKE 1 TABLET BY MOUTH DAILY   Refills:  2        blood glucose lancing device   Quantity:  1 each        Check blood glucose bid   Refills:  0        blood glucose monitoring test strip   Commonly known as:  no brand specified   Quantity:  180 strip        Check blood glucose bid   Refills:  3        cholecalciferol 5000 UNITS Caps capsule   Commonly known as:  vitamin D3   Dose:  5000 Units   Quantity:  30 capsule        Take 1 capsule (5,000 Units) by mouth daily   Refills:  5        COPAXONE 20 MG/ML injection   Dose:  20 mg   Generic drug:  glatiramer        Inject 20 mg Subcutaneous daily   Refills:  0        cyclobenzaprine 5 MG tablet   Commonly known as:  FLEXERIL   Dose:  5 mg   Quantity:  30 tablet        Take 1 tablet (5 mg) by mouth 3 times daily as needed for muscle spasms   Refills:  0        potassium  chloride SA 20 MEQ CR tablet   Commonly known as:  KLOR-CON   Dose:  20 mEq   Quantity:  30 tablet        Take 1 tablet (20 mEq) by mouth daily   Refills:  0                Procedures and tests performed during your visit     Fingers XR, 2-3 views, left      Orders Needing Specimen Collection     None      Pending Results     No orders found from 3/16/2018 to 3/19/2018.            Pending Culture Results     No orders found from 3/16/2018 to 3/19/2018.            Thank you for choosing [facility]       Thank you for choosing [facility] for your care. Our goal is always to provide you with excellent care. Hearing back from our patients is one way we can continue to improve our services. Please take a few minutes to complete the written survey that you may receive in the mail after you visit with us. Thank you!        [employer] Information     [facility] gives you secure access to your electronic health record. If you see a primary care provider, you can also send messages to your care team and make appointments. If you have questions, please call your primary care clinic.  If you do not have a primary care provider, please call [phone] and they will assist you.        Care EveryWhere ID     This is your Care EveryWhere ID. This could be used by other organizations to access your [facility] medical records  [id number]        Equal Access to Services     ZACARIAS SOSA AH: Beatrice Beach, carmella garrison, augie farris, jaren dixon. So [facility] [phone].    ATENCIÓN: Si habla español, tiene a quinn disposición servicios gratuitos de asistencia lingüística. Llame al [phone].    We comply with applicable federal civil rights laws and [place] laws. We do not discriminate on the basis of race, color, national origin, age, disability, sex, sexual orientation, or gender identity.            After Visit Summary       This is your record. Keep this with you and show to your  community pharmacist(s) and doctor(s) at your next visit.

## 2018-03-18 NOTE — DISCHARGE INSTRUCTIONS
Monitor symptoms.   Take ibuprofen for pain if needed.   Follow up with PCP if symptoms worsen or concerns develop.
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
## 2018-03-18 NOTE — ED AVS SNAPSHOT
[facility]    [address] [address]    Phone:  [phone]                                       Carin Shea   MRN: [id number]    Department:  [facility]   Date of Visit:  3/18/2018           After Visit Summary Signature Page     I have received my discharge instructions, and my questions have been answered. I have discussed any challenges I see with this plan with the nurse or doctor.    ..........................................................................................................................................  Patient/Patient Representative Signature      ..........................................................................................................................................  Patient Representative Print Name and Relationship to Patient    ..................................................               ................................................  Date                                            Time    ..........................................................................................................................................  Reviewed by Signature/Title    ...................................................              ..............................................  Date                                                            Time
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
## 2018-03-20 ENCOUNTER — HOSPITAL ENCOUNTER (OUTPATIENT)
Dept: CT IMAGING | Facility: HOSPITAL | Age: 48
Discharge: HOME | End: 2018-03-20
Attending: NURSE PRACTITIONER | Admitting: NURSE PRACTITIONER
Payer: COMMERCIAL

## 2018-03-20 DIAGNOSIS — R20.2 NUMBNESS AND TINGLING OF RIGHT ARM AND LEG: ICD-10-CM

## 2018-03-20 DIAGNOSIS — R20.0 NUMBNESS AND TINGLING OF RIGHT ARM AND LEG: ICD-10-CM

## 2018-03-20 LAB
CREAT BLD-MCNC: 0.6 MG/DL (ref 0.52–1.04)
GFR SERPL CREATININE-BSD FRML MDRD: >90 ML/MIN/1.7M2

## 2018-03-20 PROCEDURE — 82565 ASSAY OF CREATININE: CPT

## 2018-03-20 PROCEDURE — 25000128 H RX IP 250 OP 636: Performed by: RADIOLOGY

## 2018-03-20 PROCEDURE — 70498 CT ANGIOGRAPHY NECK: CPT | Mod: TC

## 2018-03-20 RX ORDER — IOPAMIDOL 755 MG/ML
50 INJECTION, SOLUTION INTRAVASCULAR ONCE
Status: COMPLETED | OUTPATIENT
Start: 2018-03-20 | End: 2018-03-20

## 2018-03-20 RX ADMIN — IOPAMIDOL 50 ML: 755 INJECTION, SOLUTION INTRAVENOUS at 14:03

## 2018-04-19 ENCOUNTER — HOSPITAL ENCOUNTER (OUTPATIENT)
Dept: CT IMAGING | Facility: HOSPITAL | Age: 48
Discharge: HOME | End: 2018-04-19
Attending: NURSE PRACTITIONER | Admitting: NURSE PRACTITIONER
Payer: COMMERCIAL

## 2018-04-19 DIAGNOSIS — R06.02 SHORT OF BREATH ON EXERTION: ICD-10-CM

## 2018-04-19 DIAGNOSIS — R79.89 D-DIMER, ELEVATED: ICD-10-CM

## 2018-04-19 PROCEDURE — 71275 CT ANGIOGRAPHY CHEST: CPT | Mod: TC

## 2018-04-19 PROCEDURE — 25000128 H RX IP 250 OP 636: Performed by: RADIOLOGY

## 2018-04-19 RX ORDER — IOPAMIDOL 755 MG/ML
75 INJECTION, SOLUTION INTRAVASCULAR ONCE
Status: COMPLETED | OUTPATIENT
Start: 2018-04-19 | End: 2018-04-19

## 2018-04-19 RX ADMIN — IOPAMIDOL 75 ML: 755 INJECTION, SOLUTION INTRAVENOUS at 12:49

## 2019-12-16 ENCOUNTER — HEALTH MAINTENANCE LETTER (OUTPATIENT)
Age: 49
End: 2019-12-16

## 2021-01-15 ENCOUNTER — HEALTH MAINTENANCE LETTER (OUTPATIENT)
Age: 51
End: 2021-01-15

## 2021-01-24 ENCOUNTER — HEALTH MAINTENANCE LETTER (OUTPATIENT)
Age: 51
End: 2021-01-24

## 2021-09-05 ENCOUNTER — HEALTH MAINTENANCE LETTER (OUTPATIENT)
Age: 51
End: 2021-09-05

## 2022-02-17 PROBLEM — G89.4 CHRONIC PAIN SYNDROME: Status: ACTIVE | Noted: 2017-05-26

## 2022-02-19 ENCOUNTER — HEALTH MAINTENANCE LETTER (OUTPATIENT)
Age: 52
End: 2022-02-19

## 2022-04-16 ENCOUNTER — HEALTH MAINTENANCE LETTER (OUTPATIENT)
Age: 52
End: 2022-04-16

## 2022-10-22 ENCOUNTER — HEALTH MAINTENANCE LETTER (OUTPATIENT)
Age: 52
End: 2022-10-22

## 2023-04-01 ENCOUNTER — HEALTH MAINTENANCE LETTER (OUTPATIENT)
Age: 53
End: 2023-04-01

## 2023-06-01 ENCOUNTER — HEALTH MAINTENANCE LETTER (OUTPATIENT)
Age: 53
End: 2023-06-01

## 2023-06-27 ENCOUNTER — TRANSFERRED RECORDS (OUTPATIENT)
Dept: HEALTH INFORMATION MANAGEMENT | Facility: CLINIC | Age: 53
End: 2023-06-27

## 2024-01-14 ENCOUNTER — HEALTH MAINTENANCE LETTER (OUTPATIENT)
Age: 54
End: 2024-01-14

## 2024-10-18 ENCOUNTER — HOSPITAL ENCOUNTER (EMERGENCY)
Facility: CLINIC | Age: 54
Discharge: HOME | End: 2024-10-18
Attending: EMERGENCY MEDICINE | Admitting: EMERGENCY MEDICINE
Payer: COMMERCIAL

## 2024-10-18 VITALS
TEMPERATURE: 96.9 F | SYSTOLIC BLOOD PRESSURE: 124 MMHG | HEIGHT: 64 IN | WEIGHT: 227.51 LBS | BODY MASS INDEX: 38.84 KG/M2 | OXYGEN SATURATION: 99 % | HEART RATE: 71 BPM | RESPIRATION RATE: 14 BRPM | DIASTOLIC BLOOD PRESSURE: 83 MMHG

## 2024-10-18 DIAGNOSIS — K04.01 ACUTE PULPITIS: ICD-10-CM

## 2024-10-18 DIAGNOSIS — K08.89 DENTALGIA: ICD-10-CM

## 2024-10-18 PROCEDURE — 99283 EMERGENCY DEPT VISIT LOW MDM: CPT

## 2024-10-18 RX ORDER — CLINDAMYCIN HYDROCHLORIDE 150 MG/1
450 CAPSULE ORAL 3 TIMES DAILY
Qty: 63 CAPSULE | Refills: 0 | Status: SHIPPED | OUTPATIENT
Start: 2024-10-18 | End: 2024-10-25

## 2024-10-18 RX ORDER — IBUPROFEN 600 MG/1
600 TABLET, FILM COATED ORAL EVERY 6 HOURS PRN
Qty: 30 TABLET | Refills: 0 | Status: SHIPPED | OUTPATIENT
Start: 2024-10-18 | End: 2024-11-17

## 2024-10-18 ASSESSMENT — COLUMBIA-SUICIDE SEVERITY RATING SCALE - C-SSRS
1. IN THE PAST MONTH, HAVE YOU WISHED YOU WERE DEAD OR WISHED YOU COULD GO TO SLEEP AND NOT WAKE UP?: NO
2. HAVE YOU ACTUALLY HAD ANY THOUGHTS OF KILLING YOURSELF IN THE PAST MONTH?: NO
6. HAVE YOU EVER DONE ANYTHING, STARTED TO DO ANYTHING, OR PREPARED TO DO ANYTHING TO END YOUR LIFE?: NO

## 2024-10-18 ASSESSMENT — ACTIVITIES OF DAILY LIVING (ADL): ADLS_ACUITY_SCORE: 35

## 2024-10-18 NOTE — ED TRIAGE NOTES
"Patient ambulatory reporting an infected tooth for the last 4 days. Patient states she has been unable to see a dentist. Patient states tylenol and ibuprofen at home have not been effective. Patient states she feels \"restricted\" when she swallows. ABCs intact without intervention.         "
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
## 2024-10-20 ENCOUNTER — HEALTH MAINTENANCE LETTER (OUTPATIENT)
Age: 54
End: 2024-10-20

## 2025-05-04 ENCOUNTER — HEALTH MAINTENANCE LETTER (OUTPATIENT)
Age: 55
End: 2025-05-04

## 2025-05-18 ENCOUNTER — HOSPITAL ENCOUNTER (EMERGENCY)
Facility: CLINIC | Age: 55
Discharge: HOME | End: 2025-05-18
Attending: EMERGENCY MEDICINE | Admitting: EMERGENCY MEDICINE
Payer: COMMERCIAL

## 2025-05-18 ENCOUNTER — APPOINTMENT (OUTPATIENT)
Dept: MRI IMAGING | Facility: CLINIC | Age: 55
End: 2025-05-18
Attending: EMERGENCY MEDICINE
Payer: COMMERCIAL

## 2025-05-18 ENCOUNTER — APPOINTMENT (OUTPATIENT)
Dept: GENERAL RADIOLOGY | Facility: CLINIC | Age: 55
End: 2025-05-18
Attending: EMERGENCY MEDICINE
Payer: COMMERCIAL

## 2025-05-18 VITALS
SYSTOLIC BLOOD PRESSURE: 125 MMHG | HEART RATE: 85 BPM | WEIGHT: 230.6 LBS | RESPIRATION RATE: 18 BRPM | DIASTOLIC BLOOD PRESSURE: 81 MMHG | OXYGEN SATURATION: 97 % | TEMPERATURE: 97.5 F | BODY MASS INDEX: 39.58 KG/M2

## 2025-05-18 DIAGNOSIS — M25.512 LEFT SHOULDER PAIN, UNSPECIFIED CHRONICITY: ICD-10-CM

## 2025-05-18 DIAGNOSIS — M54.2 NECK PAIN ON LEFT SIDE: ICD-10-CM

## 2025-05-18 LAB
ANION GAP SERPL CALCULATED.3IONS-SCNC: 12 MMOL/L (ref 7–15)
BASOPHILS # BLD AUTO: 0.1 10E3/UL (ref 0–0.2)
BASOPHILS NFR BLD AUTO: 1 %
BUN SERPL-MCNC: 10.6 MG/DL (ref 6–20)
CALCIUM SERPL-MCNC: 9.3 MG/DL (ref 8.8–10.4)
CHLORIDE SERPL-SCNC: 107 MMOL/L (ref 98–107)
CREAT SERPL-MCNC: 0.66 MG/DL (ref 0.51–0.95)
EGFRCR SERPLBLD CKD-EPI 2021: >90 ML/MIN/1.73M2
EOSINOPHIL # BLD AUTO: 0.3 10E3/UL (ref 0–0.7)
EOSINOPHIL NFR BLD AUTO: 4 %
ERYTHROCYTE [DISTWIDTH] IN BLOOD BY AUTOMATED COUNT: 12 % (ref 10–15)
GLUCOSE SERPL-MCNC: 143 MG/DL (ref 70–99)
HCO3 SERPL-SCNC: 22 MMOL/L (ref 22–29)
HCT VFR BLD AUTO: 36.9 % (ref 35–47)
HGB BLD-MCNC: 12.8 G/DL (ref 11.7–15.7)
HOLD SPECIMEN: NORMAL
HOLD SPECIMEN: NORMAL
IMM GRANULOCYTES # BLD: 0 10E3/UL
IMM GRANULOCYTES NFR BLD: 0 %
LYMPHOCYTES # BLD AUTO: 2.5 10E3/UL (ref 0.8–5.3)
LYMPHOCYTES NFR BLD AUTO: 35 %
MCH RBC QN AUTO: 30.7 PG (ref 26.5–33)
MCHC RBC AUTO-ENTMCNC: 34.7 G/DL (ref 31.5–36.5)
MCV RBC AUTO: 89 FL (ref 78–100)
MONOCYTES # BLD AUTO: 0.5 10E3/UL (ref 0–1.3)
MONOCYTES NFR BLD AUTO: 7 %
NEUTROPHILS # BLD AUTO: 3.8 10E3/UL (ref 1.6–8.3)
NEUTROPHILS NFR BLD AUTO: 53 %
NRBC # BLD AUTO: 0 10E3/UL
NRBC BLD AUTO-RTO: 0 /100
PLATELET # BLD AUTO: 262 10E3/UL (ref 150–450)
POTASSIUM SERPL-SCNC: 3.7 MMOL/L (ref 3.4–5.3)
RBC # BLD AUTO: 4.17 10E6/UL (ref 3.8–5.2)
SODIUM SERPL-SCNC: 141 MMOL/L (ref 135–145)
TROPONIN T SERPL HS-MCNC: <6 NG/L
WBC # BLD AUTO: 7.1 10E3/UL (ref 4–11)

## 2025-05-18 PROCEDURE — 84484 ASSAY OF TROPONIN QUANT: CPT | Performed by: EMERGENCY MEDICINE

## 2025-05-18 PROCEDURE — 71046 X-RAY EXAM CHEST 2 VIEWS: CPT

## 2025-05-18 PROCEDURE — 73030 X-RAY EXAM OF SHOULDER: CPT | Mod: LT

## 2025-05-18 PROCEDURE — 255N000002 HC RX 255 OP 636: Performed by: EMERGENCY MEDICINE

## 2025-05-18 PROCEDURE — 93005 ELECTROCARDIOGRAM TRACING: CPT

## 2025-05-18 PROCEDURE — 36415 COLL VENOUS BLD VENIPUNCTURE: CPT | Performed by: EMERGENCY MEDICINE

## 2025-05-18 PROCEDURE — 72156 MRI NECK SPINE W/O & W/DYE: CPT

## 2025-05-18 PROCEDURE — 250N000012 HC RX MED GY IP 250 OP 636 PS 637: Performed by: EMERGENCY MEDICINE

## 2025-05-18 PROCEDURE — 85025 COMPLETE CBC W/AUTO DIFF WBC: CPT | Performed by: EMERGENCY MEDICINE

## 2025-05-18 PROCEDURE — 96375 TX/PRO/DX INJ NEW DRUG ADDON: CPT

## 2025-05-18 PROCEDURE — 250N000011 HC RX IP 250 OP 636: Performed by: EMERGENCY MEDICINE

## 2025-05-18 PROCEDURE — 250N000013 HC RX MED GY IP 250 OP 250 PS 637: Performed by: EMERGENCY MEDICINE

## 2025-05-18 PROCEDURE — 96374 THER/PROPH/DIAG INJ IV PUSH: CPT | Mod: 59

## 2025-05-18 PROCEDURE — 70549 MR ANGIOGRAPH NECK W/O&W/DYE: CPT

## 2025-05-18 PROCEDURE — A9585 GADOBUTROL INJECTION: HCPCS | Performed by: EMERGENCY MEDICINE

## 2025-05-18 PROCEDURE — 80048 BASIC METABOLIC PNL TOTAL CA: CPT | Performed by: EMERGENCY MEDICINE

## 2025-05-18 PROCEDURE — 99285 EMERGENCY DEPT VISIT HI MDM: CPT | Mod: 25

## 2025-05-18 RX ORDER — OXYCODONE HYDROCHLORIDE 5 MG/1
5 TABLET ORAL EVERY 6 HOURS PRN
Qty: 6 TABLET | Refills: 0 | Status: SHIPPED | OUTPATIENT
Start: 2025-05-18 | End: 2025-05-21

## 2025-05-18 RX ORDER — OXYCODONE HYDROCHLORIDE 5 MG/1
10 TABLET ORAL ONCE
Refills: 0 | Status: COMPLETED | OUTPATIENT
Start: 2025-05-18 | End: 2025-05-18

## 2025-05-18 RX ORDER — GADOBUTROL 604.72 MG/ML
10 INJECTION INTRAVENOUS ONCE
Status: COMPLETED | OUTPATIENT
Start: 2025-05-18 | End: 2025-05-18

## 2025-05-18 RX ORDER — LIDOCAINE 4 G/G
1 PATCH TOPICAL ONCE
Status: DISCONTINUED | OUTPATIENT
Start: 2025-05-18 | End: 2025-05-18 | Stop reason: HOSPADM

## 2025-05-18 RX ORDER — LIDOCAINE 4 G/G
1 PATCH TOPICAL EVERY 24 HOURS
Qty: 15 PATCH | Refills: 0 | Status: SHIPPED | OUTPATIENT
Start: 2025-05-18

## 2025-05-18 RX ORDER — ACETAMINOPHEN 500 MG
1000 TABLET ORAL EVERY 8 HOURS PRN
Qty: 30 TABLET | Refills: 0 | Status: SHIPPED | OUTPATIENT
Start: 2025-05-18

## 2025-05-18 RX ORDER — IBUPROFEN 200 MG
400 TABLET ORAL EVERY 8 HOURS PRN
Qty: 30 TABLET | Refills: 0 | Status: SHIPPED | OUTPATIENT
Start: 2025-05-18

## 2025-05-18 RX ORDER — METHYLPREDNISOLONE 4 MG/1
TABLET ORAL
Qty: 21 TABLET | Refills: 0 | Status: SHIPPED | OUTPATIENT
Start: 2025-05-18

## 2025-05-18 RX ORDER — KETOROLAC TROMETHAMINE 15 MG/ML
15 INJECTION, SOLUTION INTRAMUSCULAR; INTRAVENOUS ONCE
Status: COMPLETED | OUTPATIENT
Start: 2025-05-18 | End: 2025-05-18

## 2025-05-18 RX ORDER — PREDNISONE 20 MG/1
60 TABLET ORAL ONCE
Status: COMPLETED | OUTPATIENT
Start: 2025-05-18 | End: 2025-05-18

## 2025-05-18 RX ORDER — LORAZEPAM 2 MG/ML
1 INJECTION INTRAMUSCULAR ONCE
Status: COMPLETED | OUTPATIENT
Start: 2025-05-18 | End: 2025-05-18

## 2025-05-18 RX ORDER — PREDNISONE 20 MG/1
60 TABLET ORAL ONCE
Status: DISCONTINUED | OUTPATIENT
Start: 2025-05-18 | End: 2025-05-18

## 2025-05-18 RX ADMIN — OXYCODONE HYDROCHLORIDE 10 MG: 5 TABLET ORAL at 15:38

## 2025-05-18 RX ADMIN — PREDNISONE 60 MG: 20 TABLET ORAL at 15:38

## 2025-05-18 RX ADMIN — LIDOCAINE 1 PATCH: 4 PATCH TOPICAL at 15:39

## 2025-05-18 RX ADMIN — LORAZEPAM 1 MG: 2 INJECTION INTRAMUSCULAR; INTRAVENOUS at 16:46

## 2025-05-18 RX ADMIN — GADOBUTROL 10 ML: 604.72 INJECTION INTRAVENOUS at 16:52

## 2025-05-18 RX ADMIN — KETOROLAC TROMETHAMINE 15 MG: 15 INJECTION, SOLUTION INTRAMUSCULAR; INTRAVENOUS at 15:42

## 2025-05-18 ASSESSMENT — COLUMBIA-SUICIDE SEVERITY RATING SCALE - C-SSRS
2. HAVE YOU ACTUALLY HAD ANY THOUGHTS OF KILLING YOURSELF IN THE PAST MONTH?: NO
1. IN THE PAST MONTH, HAVE YOU WISHED YOU WERE DEAD OR WISHED YOU COULD GO TO SLEEP AND NOT WAKE UP?: NO
6. HAVE YOU EVER DONE ANYTHING, STARTED TO DO ANYTHING, OR PREPARED TO DO ANYTHING TO END YOUR LIFE?: NO

## 2025-05-18 ASSESSMENT — ACTIVITIES OF DAILY LIVING (ADL)
ADLS_ACUITY_SCORE: 41

## 2025-05-18 NOTE — ED TRIAGE NOTES
Left sided neck and shoulder pain. Started 1 week ago and pain has gotten progressively worse. Denies injury to the area. Tender to palpation. No redness, bruising or swelling noted. Denies chest pain and shortness of breath.
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
## 2025-05-19 LAB
ATRIAL RATE - MUSE: 73 BPM
DIASTOLIC BLOOD PRESSURE - MUSE: NORMAL MMHG
INTERPRETATION ECG - MUSE: NORMAL
P AXIS - MUSE: 32 DEGREES
PR INTERVAL - MUSE: 174 MS
QRS DURATION - MUSE: 94 MS
QT - MUSE: 418 MS
QTC - MUSE: 460 MS
R AXIS - MUSE: 13 DEGREES
SYSTOLIC BLOOD PRESSURE - MUSE: NORMAL MMHG
T AXIS - MUSE: 52 DEGREES
VENTRICULAR RATE- MUSE: 73 BPM

## 2025-05-27 ENCOUNTER — OFFICE VISIT (OUTPATIENT)
Dept: URGENT CARE | Facility: URGENT CARE | Age: 55
End: 2025-05-27
Payer: COMMERCIAL

## 2025-05-27 ENCOUNTER — ANCILLARY PROCEDURE (OUTPATIENT)
Dept: GENERAL RADIOLOGY | Facility: CLINIC | Age: 55
End: 2025-05-27
Attending: PHYSICIAN ASSISTANT
Payer: COMMERCIAL

## 2025-05-27 VITALS
TEMPERATURE: 98.3 F | BODY MASS INDEX: 39.36 KG/M2 | OXYGEN SATURATION: 99 % | HEART RATE: 82 BPM | RESPIRATION RATE: 23 BRPM | DIASTOLIC BLOOD PRESSURE: 94 MMHG | SYSTOLIC BLOOD PRESSURE: 148 MMHG | WEIGHT: 229.3 LBS

## 2025-05-27 DIAGNOSIS — G35 MULTIPLE SCLEROSIS (H): ICD-10-CM

## 2025-05-27 DIAGNOSIS — G89.29 CHRONIC NECK PAIN: Primary | ICD-10-CM

## 2025-05-27 DIAGNOSIS — M54.2 CHRONIC NECK PAIN: ICD-10-CM

## 2025-05-27 DIAGNOSIS — G89.29 CHRONIC NECK PAIN: ICD-10-CM

## 2025-05-27 DIAGNOSIS — E11.65 TYPE 2 DIABETES MELLITUS WITH HYPERGLYCEMIA, WITHOUT LONG-TERM CURRENT USE OF INSULIN (H): ICD-10-CM

## 2025-05-27 DIAGNOSIS — M62.838 NECK MUSCLE SPASM: ICD-10-CM

## 2025-05-27 DIAGNOSIS — M54.2 CHRONIC NECK PAIN: Primary | ICD-10-CM

## 2025-05-27 PROCEDURE — 72040 X-RAY EXAM NECK SPINE 2-3 VW: CPT | Mod: TC | Performed by: RADIOLOGY

## 2025-05-27 PROCEDURE — 3080F DIAST BP >= 90 MM HG: CPT | Performed by: PHYSICIAN ASSISTANT

## 2025-05-27 PROCEDURE — 3077F SYST BP >= 140 MM HG: CPT | Performed by: PHYSICIAN ASSISTANT

## 2025-05-27 PROCEDURE — 99204 OFFICE O/P NEW MOD 45 MIN: CPT | Performed by: PHYSICIAN ASSISTANT

## 2025-05-27 RX ORDER — HYDROCODONE BITARTRATE AND ACETAMINOPHEN 5; 325 MG/1; MG/1
1 TABLET ORAL
Qty: 10 TABLET | Refills: 0 | Status: SHIPPED | OUTPATIENT
Start: 2025-05-27

## 2025-05-27 RX ORDER — METHOCARBAMOL 750 MG/1
750 TABLET, FILM COATED ORAL 3 TIMES DAILY
Qty: 30 TABLET | Refills: 0 | Status: SHIPPED | OUTPATIENT
Start: 2025-05-27

## 2025-05-27 RX ORDER — CELECOXIB 200 MG/1
200 CAPSULE ORAL DAILY
Qty: 14 CAPSULE | Refills: 0 | Status: SHIPPED | OUTPATIENT
Start: 2025-05-27

## 2025-05-27 NOTE — PROGRESS NOTES
Assessment & Plan     Chronic neck pain    You can have neck pain anywhere from the bottom of your head to the top of your shoulders. It can spread to the upper back or arms. Injuries, painting a ceiling, sleeping with your neck twisted, staying in one position for too long, and many other activities can cause neck pain.  Most neck pain gets better with home care. Your doctor may recommend medicine to relieve pain or relax your muscles. He or she may suggest exercise and physical therapy to increase flexibility and relieve stress. You may need to wear a special (cervical) collar to support your neck for a day or two.    Referral to ortho  - Orthopedic  Referral  - XR Cervical Spine 2/3 Views  - celecoxib (CELEBREX) 200 MG capsule  Dispense: 14 capsule; Refill: 0    Type 2 diabetes mellitus with hyperglycemia, without long-term current use of insulin (H)    Will avoid prednisone at this time due to just having this and having diabetes    Multiple sclerosis (H)    Advised to continue follow up with PCP    Neck muscle spasm    A neck spasm is sudden tightness and pain in your neck muscles. A spasm may be caused by some activities or repeated movements. For example, you may be more likely to have a neck spasm if you slouch, paint a ceiling, work at a computer, or sleep with your neck twisted. But the cause isn't always clear.  Home treatment includes using heat or ice, taking over-the-counter (OTC) pain medicines, and avoiding activities that may lead to neck pain. Gentle stretching, or treatments such as massage or manipulation, may also help ease a neck spasm.  For a neck spasm that doesn't get better with home care, your doctor may prescribe medicine. The doctor may also suggest exercise or physical therapy to help strengthen or relax your neck muscles.    - methocarbamol (ROBAXIN) 750 MG tablet  Dispense: 30 tablet; Refill: 0       Referral to ortho for neck pain    No follow-ups on file.    Kobe BLACK  KATHARINE Denis, VEGA  M [facility] KAYCEE Del Rosario is a 54 year old female who presents to clinic today for the following health issues:  Chief Complaint   Patient presents with    Urgent Care     Neck pain L backside/sounds like a head of lettuce breaking         5/27/2025     3:43 PM   Additional Questions   Roomed by Bailey   Accompanied by self         5/27/2025   Forms   Any forms needing to be completed Yes     HPI  Review of Systems  Constitutional, HEENT, cardiovascular, pulmonary, gi and gu systems are negative, except as otherwise noted.      Objective    BP (!) 148/94   Pulse 82   Temp 98.3  F (36.8  C) (Tympanic)   Resp 23   Wt 104 kg (229 lb 4.8 oz)   LMP 11/19/2017   SpO2 99%   BMI 39.36 kg/m    Physical Exam   GENERAL: alert and no distress  EYES: Eyes grossly normal to inspection, PERRL and conjunctivae and sclerae normal  HENT: ear canals and TM's normal, nose and mouth without ulcers or lesions  NECK: pos for left side neck tenderness, pain  RESP: lungs clear to auscultation - no rales, rhonchi or wheezes  CV: regular rate and rhythm, normal S1 S2, no S3 or S4, no murmur, click or rub, no peripheral edema  MS: pos for upper left side back tightness  SKIN: no suspicious lesions or rashes  NEURO: Normal strength and tone, mentation intact and speech normal  PSYCH: mentation appears normal, affect normal/bright    C-spine Negative for acute findings, read by Kobe ALCANTAR at time of visit.

## 2025-05-27 NOTE — PROGRESS NOTES
Urgent Care Clinic Visit    Chief Complaint   Patient presents with    Urgent Care     Neck pain L backside/sounds like a head of lettuce breaking              5/27/2025     3:43 PM   Additional Questions   Roomed by Bailey   Accompanied by self         5/27/2025   Forms   Any forms needing to be completed Yes

## 2025-05-27 NOTE — LETTER
May 27, 2025      Carin Shea  [address]   [place] [facility] [place] [address]        To Whom It May Concern:    Carin Shea was seen in our clinic today.  She has been seen for this same neck pain in the ED on the 18th.  She was able to return to work on 5/15/25.  Sincerely,        Kobe Denis, [facility] PAC      Electronically signed

## 2025-05-28 ENCOUNTER — PATIENT OUTREACH (OUTPATIENT)
Dept: CARE COORDINATION | Facility: CLINIC | Age: 55
End: 2025-05-28
Payer: COMMERCIAL

## 2025-06-04 ENCOUNTER — OFFICE VISIT (OUTPATIENT)
Dept: PEDIATRICS | Facility: CLINIC | Age: 55
End: 2025-06-04
Payer: COMMERCIAL

## 2025-06-04 VITALS
SYSTOLIC BLOOD PRESSURE: 122 MMHG | DIASTOLIC BLOOD PRESSURE: 84 MMHG | HEART RATE: 84 BPM | BODY MASS INDEX: 40.86 KG/M2 | OXYGEN SATURATION: 99 % | RESPIRATION RATE: 16 BRPM | TEMPERATURE: 97.8 F | HEIGHT: 63 IN | WEIGHT: 230.6 LBS

## 2025-06-04 DIAGNOSIS — H46.9 OPTIC NEURITIS DUE TO MULTIPLE SCLEROSIS (H): ICD-10-CM

## 2025-06-04 DIAGNOSIS — I10 BENIGN ESSENTIAL HYPERTENSION: ICD-10-CM

## 2025-06-04 DIAGNOSIS — M54.2 NECK PAIN: Primary | ICD-10-CM

## 2025-06-04 DIAGNOSIS — G35 OPTIC NEURITIS DUE TO MULTIPLE SCLEROSIS (H): ICD-10-CM

## 2025-06-04 DIAGNOSIS — F41.1 GAD (GENERALIZED ANXIETY DISORDER): ICD-10-CM

## 2025-06-04 DIAGNOSIS — G35 MULTIPLE SCLEROSIS (H): ICD-10-CM

## 2025-06-04 DIAGNOSIS — G89.4 CHRONIC PAIN SYNDROME: ICD-10-CM

## 2025-06-04 DIAGNOSIS — Z12.11 SCREEN FOR COLON CANCER: ICD-10-CM

## 2025-06-04 DIAGNOSIS — Z80.0 FAMILY HISTORY OF COLON CANCER IN MOTHER: ICD-10-CM

## 2025-06-04 DIAGNOSIS — Z86.0100 HISTORY OF COLONIC POLYPS: ICD-10-CM

## 2025-06-04 DIAGNOSIS — E55.9 VITAMIN D DEFICIENCY: ICD-10-CM

## 2025-06-04 DIAGNOSIS — E11.65 TYPE 2 DIABETES MELLITUS WITH HYPERGLYCEMIA, WITHOUT LONG-TERM CURRENT USE OF INSULIN (H): ICD-10-CM

## 2025-06-04 DIAGNOSIS — Z12.31 ENCOUNTER FOR SCREENING MAMMOGRAM FOR MALIGNANT NEOPLASM OF BREAST: ICD-10-CM

## 2025-06-04 DIAGNOSIS — F33.2 SEVERE EPISODE OF RECURRENT MAJOR DEPRESSIVE DISORDER, WITHOUT PSYCHOTIC FEATURES (H): ICD-10-CM

## 2025-06-04 PROBLEM — F43.10 COMPLEX POSTTRAUMATIC STRESS DISORDER: Status: ACTIVE | Noted: 2023-07-31

## 2025-06-04 PROBLEM — F33.0 MILD EPISODE OF RECURRENT MAJOR DEPRESSIVE DISORDER: Status: RESOLVED | Noted: 2017-04-05 | Resolved: 2025-06-04

## 2025-06-04 PROBLEM — E11.42 TYPE 2 DIABETES MELLITUS WITH DIABETIC POLYNEUROPATHY, WITHOUT LONG-TERM CURRENT USE OF INSULIN (H): Status: ACTIVE | Noted: 2017-04-05

## 2025-06-04 LAB
ALBUMIN SERPL BCG-MCNC: 4.2 G/DL (ref 3.5–5.2)
ALP SERPL-CCNC: 63 U/L (ref 40–150)
ALT SERPL W P-5'-P-CCNC: 40 U/L (ref 0–50)
AST SERPL W P-5'-P-CCNC: 32 U/L (ref 0–45)
BILIRUB SERPL-MCNC: 0.3 MG/DL
BILIRUBIN DIRECT (ROCHE PRO & PURE): 0.13 MG/DL (ref 0–0.45)
CHOLEST SERPL-MCNC: 135 MG/DL
CREAT UR-MCNC: 176 MG/DL
EST. AVERAGE GLUCOSE BLD GHB EST-MCNC: 117 MG/DL
FASTING STATUS PATIENT QL REPORTED: YES
HBA1C MFR BLD: 5.7 % (ref 0–5.6)
HDLC SERPL-MCNC: 45 MG/DL
LDLC SERPL CALC-MCNC: 44 MG/DL
MICROALBUMIN UR-MCNC: <12 MG/L
MICROALBUMIN/CREAT UR: NORMAL MG/G{CREAT}
NONHDLC SERPL-MCNC: 90 MG/DL
PROT SERPL-MCNC: 7.1 G/DL (ref 6.4–8.3)
TRIGL SERPL-MCNC: 231 MG/DL
VIT D+METAB SERPL-MCNC: 29 NG/ML (ref 20–50)

## 2025-06-04 PROCEDURE — 83036 HEMOGLOBIN GLYCOSYLATED A1C: CPT

## 2025-06-04 PROCEDURE — 1125F AMNT PAIN NOTED PAIN PRSNT: CPT

## 2025-06-04 PROCEDURE — G2211 COMPLEX E/M VISIT ADD ON: HCPCS

## 2025-06-04 PROCEDURE — 3044F HG A1C LEVEL LT 7.0%: CPT

## 2025-06-04 PROCEDURE — 3048F LDL-C <100 MG/DL: CPT

## 2025-06-04 PROCEDURE — 36415 COLL VENOUS BLD VENIPUNCTURE: CPT

## 2025-06-04 PROCEDURE — 82306 VITAMIN D 25 HYDROXY: CPT

## 2025-06-04 PROCEDURE — 80076 HEPATIC FUNCTION PANEL: CPT

## 2025-06-04 PROCEDURE — 82043 UR ALBUMIN QUANTITATIVE: CPT

## 2025-06-04 PROCEDURE — 96127 BRIEF EMOTIONAL/BEHAV ASSMT: CPT

## 2025-06-04 PROCEDURE — 99214 OFFICE O/P EST MOD 30 MIN: CPT | Mod: GC

## 2025-06-04 PROCEDURE — 3074F SYST BP LT 130 MM HG: CPT

## 2025-06-04 PROCEDURE — 3079F DIAST BP 80-89 MM HG: CPT

## 2025-06-04 PROCEDURE — 82570 ASSAY OF URINE CREATININE: CPT

## 2025-06-04 PROCEDURE — 80061 LIPID PANEL: CPT

## 2025-06-04 RX ORDER — FLUOXETINE HYDROCHLORIDE 40 MG/1
80 CAPSULE ORAL DAILY
COMMUNITY
Start: 2024-08-28

## 2025-06-04 RX ORDER — QUETIAPINE FUMARATE 50 MG/1
3 TABLET, FILM COATED ORAL
COMMUNITY
Start: 2024-08-28

## 2025-06-04 RX ORDER — NYSTATIN 100000 [USP'U]/G
POWDER TOPICAL
COMMUNITY
Start: 2024-04-11

## 2025-06-04 RX ORDER — SEMAGLUTIDE 2.68 MG/ML
INJECTION, SOLUTION SUBCUTANEOUS
COMMUNITY
Start: 2025-06-03

## 2025-06-04 RX ORDER — GABAPENTIN 600 MG/1
1 TABLET ORAL AT BEDTIME
COMMUNITY
Start: 2024-08-28

## 2025-06-04 RX ORDER — LOSARTAN POTASSIUM 25 MG/1
25 TABLET ORAL DAILY
COMMUNITY
Start: 2024-08-28

## 2025-06-04 RX ORDER — POTASSIUM CHLORIDE 1500 MG/1
20 TABLET, EXTENDED RELEASE ORAL DAILY
Qty: 30 TABLET | Refills: 0 | Status: SHIPPED | OUTPATIENT
Start: 2025-06-04

## 2025-06-04 ASSESSMENT — PATIENT HEALTH QUESTIONNAIRE - PHQ9
10. IF YOU CHECKED OFF ANY PROBLEMS, HOW DIFFICULT HAVE THESE PROBLEMS MADE IT FOR YOU TO DO YOUR WORK, TAKE CARE OF THINGS AT HOME, OR GET ALONG WITH OTHER PEOPLE: SOMEWHAT DIFFICULT
SUM OF ALL RESPONSES TO PHQ QUESTIONS 1-9: 9
SUM OF ALL RESPONSES TO PHQ QUESTIONS 1-9: 9

## 2025-06-04 ASSESSMENT — PAIN SCALES - GENERAL: PAINLEVEL_OUTOF10: MODERATE PAIN (4)

## 2025-06-04 NOTE — PATIENT INSTRUCTIONS
We will put in referrals to see neurology, psychology, psychiatry and orthopedics. They should call to help you schedule.     Call to schedule with Orthopedics - [place] Team at [phone].

## 2025-06-04 NOTE — PROGRESS NOTES
Assessment & Plan     (M54.2) Neck pain  (primary encounter diagnosis)  Comment: Presenting for ED and urgent care follow-up for neck pain.  Imaging obtained in the ED showed no specific fracture, however she did have a new spot of hyperintensity in the cervical spine that was concerning for progression of her multiple sclerosis.  She was given a referral to Ortho.  No specific point tenderness on exam today.  Agree with referral to Ortho.  Patient given scheduling number on discharge.  Plan: Orthopedic  Referral    (G35) Multiple sclerosis (H)  Comment: Patient with a history of multiple sclerosis diagnosed in 2007 and optic neuritis.  She has previously been following with neurology and ophthalmology, but has not seen either in many years.  She is currently on Copaxone for her multiple sclerosis.  Does not feel like it is progressing at this time.  Will refer to neurology.  Plan: Adult Neurology  Referral,         cholecalciferol (VITAMIN D3) 125 mcg (5000         units) capsule, Adult Eye  Referral    (I10) Benign essential hypertension  Comment: Patient previously taking lisinopril 25 mg, however at home she increased it to 50 mg daily.  Will plan to decrease to 25 mg and recheck her blood pressure this summer.  Plan: potassium chloride joseph ER (KLOR-CON M20) 20         MEQ CR tablet    (E11.65) Type 2 diabetes mellitus with hyperglycemia, without long-term current use of insulin (H)  Comment: Repeat A1c today 5.7, will also get urine studies. Currently stable on metformin and ozempic.  Plan: Hemoglobin A1c, Albumin Random Urine         Quantitative with Creat Ratio, Lipid panel         reflex to direct LDL Non-fasting,         cholecalciferol (VITAMIN D3) 125 mcg (5000         units) capsule, Adult Eye  Referral,         Hepatic panel (Albumin, ALT, AST, Bili, Alk         Phos, TP)    (G89.4) Chronic pain syndrome  Comment: Refill Vitamin D  Plan: cholecalciferol (VITAMIN  "D3) 125 mcg (5000         units) capsule    (H46.9,  G35) Optic neuritis due to multiple sclerosis (H)  Comment: Stable, will refer to Optho  Plan: Adult Eye  Referral    (Z86.0100) History of colonic polyps  (Z80.0) Family history of colon cancer in mother  (Z12.11) Screen for colon cancer  Comment: Routine screening, no additional concerns  Plan: Colonoscopy Screening  Referral    (F33.2) Severe episode of recurrent major depressive disorder, without psychotic features (H)  (F41.1) SAVANNAH (generalized anxiety disorder)  Comment: Currently on 80mg Prozac and Seroquel, patient still with significant symptoms. Chronic SI, but no active SI with a plan. Will plan to refer to psychiatry and psychology.  Plan: Adult Mental Health  Referral, Adult         Mental Health  Referral    (Z12.31) Encounter for screening mammogram for malignant neoplasm of breast  Comment: Routine screening  Plan: MA Screen Bilateral w/Raj    (E55.9) Vitamin D deficiency  Comment: Routine screening since patient is currently on Vitamin D  Plan: Vitamin D Deficiency    MED REC REQUIRED  Post Medication Reconciliation Status:     BMI  Estimated body mass index is 40.6 kg/m  as calculated from the following:    Height as of this encounter: 1.605 m (5' 3.19\").    Weight as of this encounter: 104.6 kg (230 lb 9.6 oz).   - Discussed ongoing ozempic prescription    Depression Screening Follow Up        6/4/2025     9:46 AM   PHQ   PHQ-9 Total Score 9    Q9: Thoughts of better off dead/self-harm past 2 weeks Several days   F/U: Thoughts of suicide or self-harm Yes   F/U: Self harm-plan No   F/U: Self-harm action No   F/U: Safety concerns No       Patient-reported         6/4/2025     9:46 AM   Last PHQ-9   1.  Little interest or pleasure in doing things 1   2.  Feeling down, depressed, or hopeless 1   3.  Trouble falling or staying asleep, or sleeping too much 1   4.  Feeling tired or having little energy 1   5.  Poor " appetite or overeating 1   6.  Feeling bad about yourself 1   7.  Trouble concentrating 1   8.  Moving slowly or restless 1   Q9: Thoughts of better off dead/self-harm past 2 weeks 1   PHQ-9 Total Score 9    In the past two weeks have you had thoughts of suicide or self harm? Yes   Do you have concerns about your personal safety or the safety of others? No   In the past 2 weeks have you thought about a plan or had intention to harm yourself? No   In the past 2 weeks have you acted on these thoughts in any way? No       Patient-reported                No data to display              Follow Up Actions Taken  Crisis resource information provided in the After Visit Summary  Referred patient back to mental health provider  Mental Health Referral placed    Discussed the following ways the patient can remain in a safe environment: Discussed being around family and having a supportive environment.    Torri Del Rosario is a 54 year old, presenting for the following health issues:  Establish Care and ER F/U      2025     9:44 AM   Additional Questions   Roomed by mf   Accompanied by self     HPI      ED/UC Followup:    Facility:  [facility]   Date of visit: 25  Reason for visit: Severe neck and shoulder pain   Current Status: Carin reports hx of MS and maintaining pain    Diagnosed with MS in , does not currently see anyone for MS, has not seen anyone for MS in a few years. Used to follow with Dr. Meenakshi Riley when she was in [place].   in .    Was previously seeing Dr. Carvalho in [place].    Was having a lot of neck pain, and went into the ED. Pain has been going on for about 1 month. Worse lifting and moving her neck. Had a lot of imaging studies done, but she thinks that it is now happening lower than originally. Has not gotten worse since she was seen in the ED. No recent injuries or trauma. In the ED, she was given prescriptions for a medrol dosepak, robaxin and norco. Has not started  "celebrex or flexeril. She has not started the pain medications and stopped the steroids.    Mood has not been good. Does have chronic SI, but no plans and \"will not follow through with it\". Does not feel like the prozac or seroquel are helping. Low energy level, overeating. Has great support system down here with multiple family members. She feels like things have really gotten worse since she lost her . Does not currently see psychiatry or psychology. Compliant with 80mg prozac and 150mg seroquel at bedtime.    Currently taking Copaxone daily for MS.    At home, she increased her losartan from 25mg to 50mg with better control today.    Wondering about increasing her Ozempic from 2mg.      Objective    /84   Pulse 84   Temp 97.8  F (36.6  C)   Resp 16   Ht 1.605 m (5' 3.19\")   Wt 104.6 kg (230 lb 9.6 oz)   LMP 11/19/2017   SpO2 99%   BMI 40.60 kg/m    Body mass index is 40.6 kg/m .  Physical Exam  Constitutional:       General: She is not in acute distress.     Appearance: Normal appearance. She is normal weight. She is not toxic-appearing.   HENT:      Head: Normocephalic and atraumatic.      Nose: Nose normal.   Eyes:      General: No scleral icterus.     Extraocular Movements: Extraocular movements intact.   Cardiovascular:      Rate and Rhythm: Normal rate and regular rhythm.      Heart sounds: No murmur heard.     No friction rub. No gallop.   Pulmonary:      Effort: Pulmonary effort is normal. No respiratory distress.      Breath sounds: Normal breath sounds. No wheezing or rales.   Abdominal:      General: Abdomen is flat.      Palpations: Abdomen is soft.   Musculoskeletal:         General: Normal range of motion.      Cervical back: Tenderness (Left posterior neck near paraspinal muscles) present. No rigidity.      Right lower leg: No edema.      Left lower leg: No edema.   Lymphadenopathy:      Cervical: No cervical adenopathy.   Skin:     General: Skin is warm and dry.      Capillary " Refill: Capillary refill takes less than 2 seconds.      Findings: No bruising or rash.   Neurological:      General: No focal deficit present.      Mental Status: She is alert and oriented to person, place, and time. Mental status is at baseline.   Psychiatric:         Mood and Affect: Mood and affect normal.         Speech: Speech normal.         Behavior: Behavior normal.            Signed Electronically by: Gilberto Tao MD    Answers submitted by the patient for this visit:  Patient Health Questionnaire (Submitted on 6/4/2025)  If you checked off any problems, how difficult have these problems made it for you to do your work, take care of things at home, or get along with other people?: Somewhat difficult  PHQ9 TOTAL SCORE: 9    Physician Attestation   I, Shun Delvalle MD, saw this patient and agree with the findings and plan of care as documented in the note.      Items personally reviewed/procedural attestation: vitals and labs.    The longitudinal plan of care for the diagnosis(es)/condition(s) as documented were addressed during this visit. Due to the added complexity in care, I will continue to support Carin in the subsequent management and with ongoing continuity of care.    Shun Delvalle MD

## 2025-06-05 ENCOUNTER — PATIENT OUTREACH (OUTPATIENT)
Dept: CARE COORDINATION | Facility: CLINIC | Age: 55
End: 2025-06-05
Payer: COMMERCIAL

## 2025-06-05 ENCOUNTER — RESULTS FOLLOW-UP (OUTPATIENT)
Dept: PEDIATRICS | Facility: CLINIC | Age: 55
End: 2025-06-05
Payer: COMMERCIAL

## 2025-06-09 ENCOUNTER — TELEPHONE (OUTPATIENT)
Dept: OPHTHALMOLOGY | Facility: CLINIC | Age: 55
End: 2025-06-09
Payer: COMMERCIAL

## 2025-06-09 ENCOUNTER — TELEPHONE (OUTPATIENT)
Dept: GASTROENTEROLOGY | Facility: CLINIC | Age: 55
End: 2025-06-09
Payer: COMMERCIAL

## 2025-06-09 ENCOUNTER — PATIENT OUTREACH (OUTPATIENT)
Dept: CARE COORDINATION | Facility: CLINIC | Age: 55
End: 2025-06-09
Payer: COMMERCIAL

## 2025-06-09 ENCOUNTER — HOSPITAL ENCOUNTER (OUTPATIENT)
Facility: CLINIC | Age: 55
End: 2025-06-09
Attending: INTERNAL MEDICINE | Admitting: INTERNAL MEDICINE
Payer: COMMERCIAL

## 2025-06-09 ENCOUNTER — HOSPITAL ENCOUNTER (OUTPATIENT)
Age: 55
End: 2025-06-09
Payer: COMMERCIAL

## 2025-06-09 DIAGNOSIS — Z12.11 ENCOUNTER FOR SCREENING COLONOSCOPY: ICD-10-CM

## 2025-06-09 DIAGNOSIS — Z86.0100 HISTORY OF COLONIC POLYPS: Primary | ICD-10-CM

## 2025-06-09 NOTE — TELEPHONE ENCOUNTER
Upon review it was noted that pt has documented personal HX narrow airway with a difficult intubation.    Pt needs to reschedule into hospital setting with MAC    Msg sent to endo scheduling.     --------------------------------------------------------------------------------------------------------------------        Pre visit planning completed.      Procedure details:    Patient scheduled for Colonoscopy on 6-18-25.     Arrival time: TBD. Procedure time TBD    Facility location: [facility]    Sedation type: MAC    Pre op exam needed? TBD    Indication for procedure:   History of colonic polyps [Z86.0100]       Screen for colon cancer [Z12.11]      History of Polyps      Chart review:     Electronic implanted devices? No    Recent diagnosis of diverticulitis within the last 6 weeks? No      Medication review:    Diabetic? Yes. Oral diabetic medications: Metformin (glucophage): HOLD day of procedure.  Diabetic injectables: Ozempic (Semaglutide). Weekly dosing of medication.  HOLD 7 days before procedure.  Follow up with managing provider.     Anticoagulants? No    Weight loss medication/injectable? No. Patient is on GLP-1 medication but for DM (see above).    Other medication HOLDING recommendations:  N/A      Prep for procedure:     Bowel prep recommendation: Extended Golytely. [facility]      [facility] [address] [place] [address] [address] [facility].  Due to: diabetes and GLP-1 agonist medication noted in chart    Procedure information and instructions sent via Not sent yet, as pt needs r/s to [facility]         Yvette Vasquez RN  Endoscopy Procedure Pre Assessment   [phone] option 3

## 2025-06-09 NOTE — TELEPHONE ENCOUNTER
Caller: Writer lucinda Del Rosario - Patient    Reason for Reschedule/Cancellation (please be detailed, any staff messages or encounters to note?):   Patient needs a hospital setting d/t hx of narrow airway w/difficult intubation - per RN Assessment    Did you cancel or rescheduled an EUS procedure? No.    Is screening questionnaire older than 3 months from the reschedule date.   If Yes, please complete screening questionnaire. No    Prior to reschedule please review:  Ordering Provider: Shun Delvalle  Sedation Determined: MAC  Does patient have any ASC Exclusions, please identify?: Yes, Hx of narrow airway with difficult intubation    Notes on Cancelled Procedure:  Procedure: Lower Endoscopy [Colonoscopy]   Date: 6/18/25  Location: [facility]; [address], [place] [id number]  Surgeon: Dolores    Rescheduled: Yes,   Procedure: Lower Endoscopy [Colonoscopy]    Date: 6/17/25   Location: [facility]; [address] [id number]    Surgeon: Lila   Sedation Level Scheduled  MAC ,  Reason for Sedation Level Per Order   Instructions updated and sent: Yes, Abran     Does patient need PAC or Pre -Op Rescheduled? : Yes, Patient informed pre-op is needed

## 2025-06-09 NOTE — TELEPHONE ENCOUNTER
[facility] Call Center    Phone Message    May a detailed message be left on voicemail: yes     Reason for Call: Appointment Intake    Referring Provider Name: Shun Delvalle MD in [facility] IM[facility]  Diagnosis and/or Symptoms: Multiple sclerosis (H) [G35]  Type 2 diabetes mellitus with hyperglycemia, without long-term current use of in...  Optic neuritis due to multiple sclerosis (H) [H46.9, G35] Hx of optic neuritis from MS, glaucoma  Writer unable to schedule per guidelines with numerous DX on referral, please review and contact pt to discuss options Thank you    Action Taken: Message routed to:  [facility] eye    Travel Screening: Not Applicable     Date of Service:

## 2025-06-09 NOTE — TELEPHONE ENCOUNTER
Routine referral to ophthalmology.    H/o Diabetes and MS per referral    Note to  to assist in next available scheduling with optometry.    DM eye exam/CEE--h/o MS Philippe Hudson RN 11:58 AM 06/09/25

## 2025-06-09 NOTE — TELEPHONE ENCOUNTER
"Endoscopy Scheduling Screen    Caller: patient    Have you had any respiratory illness or flu-like symptoms in the last 10 days?  No    What is your communication preference for Instructions and/or Bowel Prep?   Mail/USPS    What insurance is in the chart?  Other:      Ordering/Referring Provider: BETHANY ZHENG   (If ordering provider performs procedure, schedule with ordering provider unless otherwise instructed. )    BMI: Estimated body mass index is 40.6 kg/m  as calculated from the following:    Height as of 6/4/25: 1.605 m (5' 3.19\").    Weight as of 6/4/25: 104.6 kg (230 lb 9.6 oz).     Sedation Ordered  MAC/deep sedation.   BMI<= 45 45 < BMI <= 48 48 < BMI < = 50  BMI > 50   No Restrictions No [facility]  No [facility]  [facility] with exceptions Hospital Only OR Only       Do you have a history of malignant hyperthermia?  No    (Females) Are you currently pregnant?   No     Have you been diagnosed or told you have pulmonary hypertension?   No    Do you have an LVAD?  No    Have you been told you have moderate to severe sleep apnea?  No.    Have you been told you have COPD, asthma, or any other lung disease?  No    Has your doctor ordered any cardiac tests like echo, angiogram, stress test, ablation, or EKG, that you have not completed yet?  No    Do you  have a history of any heart conditions?  Yes     Have you had any hospitalizations  in the last year for heart related issues, for example a stent placement, heart attack, or cardiomyopathy?  No    Do you have any implantable devices in your body (pacemaker, ICD)?  No    Do you take nitroglycerine?  No    Have you ever had or are you waiting for an organ transplant?  No. Continue scheduling, no site restrictions.    Have you had a stroke or transient ischemic attack (TIA aka \"mini stroke\") in the last 2 years?   No.    Have you been diagnosed with or been told you have cirrhosis of the liver?   No.    Are you currently on dialysis?   No    Do you need " "assistance transferring?   No    BMI: Estimated body mass index is 40.6 kg/m  as calculated from the following:    Height as of 6/4/25: 1.605 m (5' 3.19\").    Weight as of 6/4/25: 104.6 kg (230 lb 9.6 oz).     Is patients BMI > 40 and scheduling location [facility]?  No    Do you take an injectable or oral medication for weight loss or diabetes (excluding insulin)?  Yes, hold time can be up to 7 days. Please consult with you prescribing provider to discuss endoscopy recommendations. (Please schedule at least 7 days out.)  Ozempic  Do you take the medication Naltrexone?  No    Do you take blood thinners?  No       Prep   Are you currently on dialysis or do you have chronic kidney disease?  No    Do you have a diagnosis of diabetes?  Yes (Golytely Prep)    Do you have a diagnosis of cystic fibrosis (CF)?  No    On a regular basis do you go 3 -5 days between bowel movements?  No    BMI > 40?  Yes (Extended Prep)    Preferred Pharmacy:    [facility] [address] [place] [address] [address]  [address] [address]  Phone: [phone] Fax: [phone]    Final Scheduling Details     Procedure scheduled  Colonoscopy    Surgeon:  Dolores     Date of procedure:  6/18     Pre-OP / PAC:   No - Not required for this site.    Location  [facility] - Patient preference.    Sedation   MAC/Deep Sedation - Per order.      Patient Reminders:   You will receive a call from a Nurse to review instructions and health history.  This assessment must be completed prior to your procedure.  Failure to complete the Nurse assessment may result in the procedure being cancelled.      On the day of your procedure, please designate an adult(s) who can drive you home stay with you for the next 24 hours. The medicines used in the exam will make you sleepy. You will not be able to drive.      You cannot take public transportation, ride share services, or non-medical taxi service without a responsible caregiver.  Medical transport services " are allowed with the requirement that a responsible caregiver will receive you at your destination.  We require that drivers and caregivers are confirmed prior to your procedure.

## 2025-06-10 RX ORDER — BISACODYL 5 MG/1
TABLET, DELAYED RELEASE ORAL
Qty: 4 TABLET | Refills: 0 | Status: SHIPPED | OUTPATIENT
Start: 2025-06-10

## 2025-06-10 NOTE — TELEPHONE ENCOUNTER
"Add on  Rescheduled Colonoscopy  Due to: needs hospital setting d/t hx difficult airway/intubation    Pre visit planning completed.      Procedure details:    Patient scheduled for Colonoscopy on 6/17/25.     Arrival time: 1345. Procedure time 1445    Facility location: [facility]; [facility] [place] [address]. Check in location: [facility].     Sedation type: MAC - per order: \"Chronic or scheduled pain/narcotic medication use\". Also noted hx panic attacks, PTSD, anxiety, chronic pain syndrome upon chart review.    Pre op exam needed? Yes. Patient needs to complete an in person pre-operative exam within 30 days of procedure. Informed patient pre op is needed via [employer].    Indication for procedure: Hx polyps, Screening      Chart review:    Electronic implanted devices? No    Recent diagnosis of diverticulitis within the last 6 weeks? No      Medication review:    Diabetic? Yes. Oral diabetic medications: Metformin (glucophage): HOLD day of procedure.  Diabetic injectables: Ozempic (Semaglutide). Weekly dosing of medication.  HOLD 7 days before procedure.  Follow up with managing provider.     Anticoagulants? No    Weight loss medication/injectable? No. Patient is on GLP-1 medication but for DM (see above).    Other medication HOLDING recommendations:  N/A      Prep for procedure:    Bowel prep recommendation: Extended Golytely.   Bowel prep sent to [id number] [place] - [address]  Due to: BMI > 40, diabetes, and GLP-1 agonist medication noted in chart    Prep instructions sent via [employer]         Demi Qiu RN  Endoscopy Procedure Pre Assessment   [phone] option 3  "

## 2025-06-10 NOTE — TELEPHONE ENCOUNTER
Attempted to contact patient in order to complete pre assessment questions.     No answer. Left message to return call to [phone] option 3.    Pre op exam needed? Yes. Patient needs to complete an in person pre-operative exam within 30 days of procedure. Informed patient pre op is needed via voicemail. and [employer].    Callback communication sent via "[employer].      Demi Qiu RN  Endoscopy Procedure Pre-Assessment RN  [phone], option 3

## 2025-06-12 NOTE — TELEPHONE ENCOUNTER
Second call attempt to complete pre assessment.     No answer. Left message to return call to [phone] #3 by next business day prior to 4PM.    A Pre-Operative Physical will be required for your upcoming Endoscopy procedure.     Please be sure to schedule a Pre-Operative physical with your primary care provider within 30 days of your procedure date.      Virtual PAC appointments will not be accepted.       Callback communication sent via [employer].      Galina Wilcox RN  Endoscopy Procedure Pre Assessment

## 2025-06-16 ENCOUNTER — TELEPHONE (OUTPATIENT)
Dept: GASTROENTEROLOGY | Facility: CLINIC | Age: 55
End: 2025-06-16
Payer: COMMERCIAL

## 2025-06-16 NOTE — TELEPHONE ENCOUNTER
Caller: Writer to patient    Reason for Reschedule/Cancellation (please be detailed, any staff messages or encounters to note?):   Pre-assessment and pre-op not completed.     Did you cancel or rescheduled an EUS procedure? No.    Is screening questionnaire older than 3 months from the reschedule date.   If Yes, please complete screening questionnaire. No    Prior to reschedule please review:  Ordering Provider: BETHANY ZHENG  Sedation Determined: MAC  Does patient have any ASC Exclusions, please identify?: Y -  HX narrow airway with a difficult intubation    Notes on Cancelled Procedure:  Procedure: Lower Endoscopy [Colonoscopy]   Date: 6/17/2025  Location: [facility]; [facility] [facility] [address] [place] [address]   Surgeon: Lila    Rescheduled: COOKIE Valentino and sent MyChart. CTL.     CASE IN DEPOT FOR 48 HOURS

## 2025-06-16 NOTE — TELEPHONE ENCOUNTER
----- Message from Yvette GARZA sent at 6/16/2025  8:23 AM CDT -----  Regarding: cancel for 6-17  Pre assessment and pre-op not completed for upcoming Colonoscopy scheduled on 6-17-25.    Please cancel per policy.       Thank you,   Yvette Vasquez RN  Endoscopy Procedure Pre Assessment   [phone] option 3

## 2025-08-20 ENCOUNTER — TELEPHONE (OUTPATIENT)
Dept: PEDIATRICS | Facility: CLINIC | Age: 55
End: 2025-08-20

## 2025-08-20 ENCOUNTER — PATIENT OUTREACH (OUTPATIENT)
Dept: CARE COORDINATION | Facility: CLINIC | Age: 55
End: 2025-08-20

## 2025-08-20 DIAGNOSIS — F41.1 GAD (GENERALIZED ANXIETY DISORDER): ICD-10-CM

## 2025-08-20 DIAGNOSIS — F33.2 SEVERE EPISODE OF RECURRENT MAJOR DEPRESSIVE DISORDER, WITHOUT PSYCHOTIC FEATURES (H): ICD-10-CM

## 2025-08-20 DIAGNOSIS — G35 MULTIPLE SCLEROSIS (H): Primary | ICD-10-CM
